# Patient Record
Sex: MALE | Race: BLACK OR AFRICAN AMERICAN | Employment: UNEMPLOYED | URBAN - METROPOLITAN AREA
[De-identification: names, ages, dates, MRNs, and addresses within clinical notes are randomized per-mention and may not be internally consistent; named-entity substitution may affect disease eponyms.]

---

## 2018-03-26 ENCOUNTER — HOSPITAL ENCOUNTER (INPATIENT)
Age: 43
LOS: 1 days | Discharge: HOME | DRG: 881 | End: 2018-03-28
Attending: EMERGENCY MEDICINE
Payer: SELF-PAY

## 2018-03-26 DIAGNOSIS — F33.9 EPISODE OF RECURRENT MAJOR DEPRESSIVE DISORDER, UNSPECIFIED DEPRESSION EPISODE SEVERITY (HCC): Primary | ICD-10-CM

## 2018-03-26 DIAGNOSIS — F19.10 SUBSTANCE ABUSE (HCC): ICD-10-CM

## 2018-03-26 LAB
ANION GAP SERPL CALC-SCNC: 9 MMOL/L (ref 5–15)
BASOPHILS # BLD: 0 K/UL (ref 0–0.1)
BASOPHILS NFR BLD: 1 % (ref 0–1)
BUN SERPL-MCNC: 13 MG/DL (ref 6–20)
BUN/CREAT SERPL: 13 (ref 12–20)
CALCIUM SERPL-MCNC: 8.9 MG/DL (ref 8.5–10.1)
CHLORIDE SERPL-SCNC: 106 MMOL/L (ref 97–108)
CO2 SERPL-SCNC: 27 MMOL/L (ref 21–32)
CREAT SERPL-MCNC: 1 MG/DL (ref 0.7–1.3)
DIFFERENTIAL METHOD BLD: ABNORMAL
EOSINOPHIL # BLD: 0.1 K/UL (ref 0–0.4)
EOSINOPHIL NFR BLD: 1 % (ref 0–7)
ERYTHROCYTE [DISTWIDTH] IN BLOOD BY AUTOMATED COUNT: 13.6 % (ref 11.5–14.5)
ETHANOL SERPL-MCNC: 205 MG/DL
GLUCOSE SERPL-MCNC: 97 MG/DL (ref 65–100)
HCT VFR BLD AUTO: 43.1 % (ref 36.6–50.3)
HGB BLD-MCNC: 14.6 G/DL (ref 12.1–17)
IMM GRANULOCYTES # BLD: 0 K/UL (ref 0–0.04)
IMM GRANULOCYTES NFR BLD AUTO: 0 % (ref 0–0.5)
LYMPHOCYTES # BLD: 2.8 K/UL (ref 0.8–3.5)
LYMPHOCYTES NFR BLD: 34 % (ref 12–49)
MCH RBC QN AUTO: 30.5 PG (ref 26–34)
MCHC RBC AUTO-ENTMCNC: 33.9 G/DL (ref 30–36.5)
MCV RBC AUTO: 90.2 FL (ref 80–99)
MONOCYTES # BLD: 0.3 K/UL (ref 0–1)
MONOCYTES NFR BLD: 3 % (ref 5–13)
NEUTS SEG # BLD: 5.2 K/UL (ref 1.8–8)
NEUTS SEG NFR BLD: 62 % (ref 32–75)
NRBC # BLD: 0 K/UL (ref 0–0.01)
NRBC BLD-RTO: 0 PER 100 WBC
PLATELET # BLD AUTO: 252 K/UL (ref 150–400)
PMV BLD AUTO: 9.1 FL (ref 8.9–12.9)
POTASSIUM SERPL-SCNC: 3.3 MMOL/L (ref 3.5–5.1)
RBC # BLD AUTO: 4.78 M/UL (ref 4.1–5.7)
SODIUM SERPL-SCNC: 142 MMOL/L (ref 136–145)
WBC # BLD AUTO: 8.4 K/UL (ref 4.1–11.1)

## 2018-03-26 PROCEDURE — 80307 DRUG TEST PRSMV CHEM ANLYZR: CPT | Performed by: EMERGENCY MEDICINE

## 2018-03-26 PROCEDURE — 81001 URINALYSIS AUTO W/SCOPE: CPT | Performed by: EMERGENCY MEDICINE

## 2018-03-26 PROCEDURE — 99284 EMERGENCY DEPT VISIT MOD MDM: CPT

## 2018-03-26 PROCEDURE — 85025 COMPLETE CBC W/AUTO DIFF WBC: CPT | Performed by: EMERGENCY MEDICINE

## 2018-03-26 PROCEDURE — 80048 BASIC METABOLIC PNL TOTAL CA: CPT | Performed by: EMERGENCY MEDICINE

## 2018-03-26 PROCEDURE — 36415 COLL VENOUS BLD VENIPUNCTURE: CPT | Performed by: EMERGENCY MEDICINE

## 2018-03-26 PROCEDURE — 90791 PSYCH DIAGNOSTIC EVALUATION: CPT

## 2018-03-26 NOTE — IP AVS SNAPSHOT
[facility] 
 
 
 [address] La[address] Chambers Patient: Christina Mcdermott MRN: [id number] KTV:4/4/3077 About your hospitalization You were admitted on:  March 27, 2018 You last received care in the:  [facility]. 291 You were discharged on:  March 28, 2018 Why you were hospitalized Your primary diagnosis was:  Not on File Your diagnoses also included:  Substance Induced Mood Disorder (Hcc) Follow-up Information Follow up With Details Comments Contact Info [address] to Rapid access appointment 8:00AM -11:00AM & 12:00PM-3:00PM for mental health and substance abuse services. [facility] 
[phone] Fax: [phone] None   None (395) Patient stated that they have no PCP Discharge Orders None A check tatiana indicates which time of day the medication should be taken. My Medications Notice You have not been prescribed any medications. Discharge Instructions [facility]  [phone] [address] [place] [address] [phone] [place] [address]  [phone] [employer] 96-   [phone] [employer]-  [phone] 9 [facility]  [phone] [address] [place] [address]  [phone] [employer] Announcement We are excited to announce that we are making your provider's discharge notes available to you in [employer]. You will see these notes when they are completed and signed by the physician that discharged you from your recent hospital stay. If you have any questions or concerns about any information you see in [employer], please call the Health Information Department where you were seen or reach out to your Primary Care Provider for more information about your plan of care. Introducing [facility] [place] [facility]!    
 [facility] introduces [employer] patient portal. Now you can access parts of your medical record, email your doctor's office, and request medication refills online. 1. In your internet browser, go to [email or web address] 2. Click on the First Time User? Click Here link in the Sign In box. You will see the New Member Sign Up page. 3. Enter your InToTally Access Code exactly as it appears below. You will not need to use this code after youve completed the sign-up process. If you do not sign up before the expiration date, you must request a new code. · InToTally Access Code: [email or web address] Expires: 6/26/2018 11:21 AM 
 
4. Enter the last four digits of your Social Security Number (xxxx) and Date of Birth (mm/dd/yyyy) as indicated and click Submit. You will be taken to the next sign-up page. 5. Create a InToTally ID. This will be your InToTally login ID and cannot be changed, so think of one that is secure and easy to remember. 6. Create a InToTally password. You can change your password at any time. 7. Enter your Password Reset Question and Answer. This can be used at a later time if you forget your password. 8. Enter your e-mail address. You will receive e-mail notification when new information is available in [address] 9. Click Sign Up. You can now view and download portions of your medical record. 10. Click the Download Summary menu link to download a portable copy of your medical information. If you have questions, please visit the Frequently Asked Questions section of the InToTally website. Remember, InToTally is NOT to be used for urgent needs. For medical emergencies, dial 911. Now available from your iPhone and Android! Introducing Eron Wheeler As a Lovenia Stain patient, I wanted to make you aware of our electronic visit tool called Eron Wheeler. Lovenia Stain 24/7 allows you to connect within minutes with a medical provider 24 hours a day, seven days a week via a mobile device or tablet or logging into a secure website from your computer. You can access pinnacle-ecs from anywhere in the [place]. A virtual visit might be right for you when you have a simple condition and feel like you just dont want to get out of bed, or cant get away from work for an appointment, when your regular Deanna Fothergill provider is not available (evenings, weekends or holidays), or when youre out of town and need minor care. Electronic visits cost only $49 and if the Carmelita Fothergill 24/7 provider determines a prescription is needed to treat your condition, one can be electronically transmitted to a nearby pharmacy*. Please take a moment to enroll today if you have not already done so. The enrollment process is free and takes just a few minutes. To enroll, please download the [employer] therSUPRll Neck Tie Koozies/Tecogen karo to your tablet or phone, or visit [email or web address] to enroll on your computer. And, as an [address] [place] [address] patient with a Wildfire account, the results of your visits will be scanned into your electronic medical record and your primary care provider will be able to view the scanned results. We urge you to continue to see your regular [facility] provider for your ongoing medical care. And while your primary care provider may not be the one available when you seek a Eron Simonsfin virtual visit, the peace of mind you get from getting a real diagnosis real time can be priceless. For more information on Worktopiaivafin, view our Frequently Asked Questions (FAQs) at [email or web address]. Sincerely, 
 
Toñito Whipple MD 
Chief Medical Officer [address] *:  certain medications cannot be prescribed via Worktopiaivafin Providers Seen During Your Hospitalization Provider Specialty Primary office phone Montse Hall MD Emergency Medicine [phone] Nhung Galeana MD Emergency Medicine [phone] Yamileth Moreira MD Psychiatry [phone] Jurgen Knight MD Psychiatry [phone] Your Primary Care Physician (PCP) Primary Care Physician Office Phone Office Fax NONE ** None ** ** None ** You are allergic to the following Allergen Reactions Shrimp Hives Recent Documentation Height Weight BMI Smoking Status 1.829 m 68.9 kg 20.61 kg/m2 Current Every Day Smoker Emergency Contacts Name Discharge Info Relation Home Work Mobile Vilma Pichardo N/A  AT THIS TIME [6] Girlfriend [18] [phone] Patient Belongings The following personal items are in your possession at time of discharge: 
  Dental Appliances: None  Visual Aid: None      Home Medications: None   Jewelry: None  Clothing: Hat, Jacket/Coat, Socks, [place], [place], Footwear, Undergarments, Shirt    Other Valuables:  (ID) Please provide this summary of care documentation to your next provider. Signatures-by signing, you are acknowledging that this After Visit Summary has been reviewed with you and you have received a copy. Patient Signature:  ____________________________________________________________ Date:  ____________________________________________________________  
  
Akshat Current Provider Signature:  ____________________________________________________________ Date:  ____________________________________________________________

## 2018-03-26 NOTE — IP AVS SNAPSHOT
Nikki Hassan 
 
 
 [address] Patient: Danielle Waldrop MRN: [id number] VGP:7/5/7623 A check tatiana indicates which time of day the medication should be taken. My Medications Notice You have not been prescribed any medications.

## 2018-03-26 NOTE — IP AVS SNAPSHOT
Summary of Care Report The Summary of Care report has been created to help improve care coordination. Users with access to [employer] or [address] (Web-based application) may access additional patient information including the Discharge Summary. If you are not currently a [address] user and need more information, please call the number listed below in the [place] [address] section and ask to be connected with Medical Records. Facility Information Name Address Phone Nadine [phone] [place] [id number] [phone] Patient Information Patient Name Sex JERRY Garza ([id number]) Male 1975 Discharge Information Admitting Provider Service Area Unit Ruth Soares MD / [phone] [id number] [address] [facility] / [phone] Discharge Provider Discharge Date/Time Discharge Disposition Destination (none) 3/28/2018 Morning (Pending) AHR (none) Patient Language Language ENGLISH [13] Hospital Problems as of 3/28/2018  Reviewed: 10/17/2013 12:32 PM by Yuliana Cancino NP Class Noted - Resolved Last Modified POA Active Problems Substance induced mood disorder ([facility] [address]  3/27/2018 - Present 3/27/2018 by Ruth Soares MD Unknown Entered by Ruth Soares MD  
  
Non-Hospital Problems as of 3/28/2018  Reviewed: 10/17/2013 12:32 PM by Yuliana Cancino NP Class Noted - Resolved Last Modified Active Problems Depressive disorder, not elsewhere classified  10/17/2013 - Present 10/17/2013 by Yuliana Cancino NP Entered by Yuliana Cancino NP Polysubstance abuse  10/17/2013 - Present 10/17/2013 by Yuliana Cancino NP Entered by Yuliana Cancino NP You are allergic to the following Allergen Reactions Shrimp Hives Current Discharge Medication List  
  
 Notice You have not been prescribed any medications. Current Immunizations Name Date Influenza Vaccine PF  Deferred ()  
 TD Vaccine 7/3/2011 Follow-up Information Follow up With Details Comments Contact Info [address] to Rapid access appointment 8:00AM -11:00AM & 12:00PM-3:00PM for mental health and substance abuse services. [facility] 
[phone] Fax: [phone] None   None (395) Patient stated that they have no PCP Discharge Instructions [facility]ez [facility]  [phone] [address] [place] [address] [phone] [place] [address]  [phone] [employer] [address]-   [phone] [employer]-  [phone] 1 [facility]  [phone] [address] [place] [address]  [phone] Chart Review Routing History No Routing History on File

## 2018-03-27 PROBLEM — F19.94 SUBSTANCE INDUCED MOOD DISORDER (HCC): Status: ACTIVE | Noted: 2018-03-27

## 2018-03-27 LAB
AMPHET UR QL SCN: NEGATIVE
APPEARANCE UR: CLEAR
BACTERIA URNS QL MICRO: NEGATIVE /HPF
BARBITURATES UR QL SCN: NEGATIVE
BENZODIAZ UR QL: NEGATIVE
BILIRUB UR QL: NEGATIVE
CANNABINOIDS UR QL SCN: NEGATIVE
COCAINE UR QL SCN: POSITIVE
COLOR UR: NORMAL
DRUG SCRN COMMENT,DRGCM: ABNORMAL
EPITH CASTS URNS QL MICRO: NORMAL /LPF
GLUCOSE UR STRIP.AUTO-MCNC: NEGATIVE MG/DL
HGB UR QL STRIP: NEGATIVE
KETONES UR QL STRIP.AUTO: NEGATIVE MG/DL
LEUKOCYTE ESTERASE UR QL STRIP.AUTO: NEGATIVE
METHADONE UR QL: NEGATIVE
NITRITE UR QL STRIP.AUTO: NEGATIVE
OPIATES UR QL: NEGATIVE
PCP UR QL: NEGATIVE
PH UR STRIP: 6 [PH] (ref 5–8)
PROT UR STRIP-MCNC: NEGATIVE MG/DL
RBC #/AREA URNS HPF: NORMAL /HPF (ref 0–5)
SP GR UR REFRACTOMETRY: <1.005 (ref 1–1.03)
UA: UC IF INDICATED,UAUC: NORMAL
UROBILINOGEN UR QL STRIP.AUTO: 0.2 EU/DL (ref 0.2–1)
WBC URNS QL MICRO: NORMAL /HPF (ref 0–4)

## 2018-03-27 PROCEDURE — 74011250637 HC RX REV CODE- 250/637

## 2018-03-27 PROCEDURE — 65220000003 HC RM SEMIPRIVATE PSYCH

## 2018-03-27 RX ORDER — LORAZEPAM 1 MG/1
1 TABLET ORAL
Status: DISCONTINUED | OUTPATIENT
Start: 2018-03-27 | End: 2018-03-28 | Stop reason: HOSPADM

## 2018-03-27 RX ORDER — LORAZEPAM 2 MG/ML
2 INJECTION INTRAMUSCULAR
Status: DISCONTINUED | OUTPATIENT
Start: 2018-03-27 | End: 2018-03-28 | Stop reason: HOSPADM

## 2018-03-27 RX ORDER — IBUPROFEN 200 MG
1 TABLET ORAL
Status: DISCONTINUED | OUTPATIENT
Start: 2018-03-27 | End: 2018-03-28 | Stop reason: HOSPADM

## 2018-03-27 RX ORDER — ACETAMINOPHEN 325 MG/1
650 TABLET ORAL
Status: DISCONTINUED | OUTPATIENT
Start: 2018-03-27 | End: 2018-03-28 | Stop reason: HOSPADM

## 2018-03-27 RX ORDER — BENZTROPINE MESYLATE 1 MG/ML
2 INJECTION INTRAMUSCULAR; INTRAVENOUS
Status: DISCONTINUED | OUTPATIENT
Start: 2018-03-27 | End: 2018-03-28 | Stop reason: HOSPADM

## 2018-03-27 RX ORDER — ADHESIVE BANDAGE
30 BANDAGE TOPICAL DAILY PRN
Status: DISCONTINUED | OUTPATIENT
Start: 2018-03-27 | End: 2018-03-28 | Stop reason: HOSPADM

## 2018-03-27 RX ORDER — IBUPROFEN 400 MG/1
400 TABLET ORAL
Status: DISCONTINUED | OUTPATIENT
Start: 2018-03-27 | End: 2018-03-28 | Stop reason: HOSPADM

## 2018-03-27 RX ORDER — OLANZAPINE 5 MG/1
5 TABLET ORAL
Status: DISCONTINUED | OUTPATIENT
Start: 2018-03-27 | End: 2018-03-28 | Stop reason: HOSPADM

## 2018-03-27 RX ORDER — BENZTROPINE MESYLATE 2 MG/1
2 TABLET ORAL
Status: DISCONTINUED | OUTPATIENT
Start: 2018-03-27 | End: 2018-03-28 | Stop reason: HOSPADM

## 2018-03-27 RX ORDER — FOLIC ACID 1 MG/1
1 TABLET ORAL DAILY
Status: DISCONTINUED | OUTPATIENT
Start: 2018-03-27 | End: 2018-03-28 | Stop reason: HOSPADM

## 2018-03-27 RX ORDER — ZOLPIDEM TARTRATE 10 MG/1
10 TABLET ORAL
Status: DISCONTINUED | OUTPATIENT
Start: 2018-03-27 | End: 2018-03-28 | Stop reason: HOSPADM

## 2018-03-27 RX ORDER — LANOLIN ALCOHOL/MO/W.PET/CERES
100 CREAM (GRAM) TOPICAL DAILY
Status: DISCONTINUED | OUTPATIENT
Start: 2018-03-27 | End: 2018-03-28 | Stop reason: HOSPADM

## 2018-03-27 RX ADMIN — MULTIPLE VITAMINS W/ MINERALS TAB 1 TABLET: TAB at 08:56

## 2018-03-27 RX ADMIN — Medication 100 MG: at 08:56

## 2018-03-27 RX ADMIN — FOLIC ACID 1 MG: 1 TABLET ORAL at 08:56

## 2018-03-27 NOTE — BH NOTES
Pt admitted for increased depression with SI/HI with no plan or focus. Pt's girlfriend left him 3/15 and he has been depressed. Pt stated he started to drink beer and use cocaine a couple days ago after being clean for 2-3 years. Pt reports diagnosis of depression in past, not currently taking any medications. Pt is current smoker, 1/2 pack/day. Pt lives in an apartment with his mother as his support system. Pt has depressed, flat affect, soft speech and poor eye contact. Skin search revealed tattoos on forearms, upper chest and abdomin bilaterally as well as old scars on back and legs. Pt given bag lunch and oriented to unit/room. Safety checks initiated Q15.

## 2018-03-27 NOTE — BH NOTES
GROUP THERAPY PROGRESS NOTE    The patient Jonathon Camarena a 39 y.o. male is participating in Creative Expression Group. Group time: 1 hour    Personal goal for participation:  To concentrate on selected task    Goal orientation: social    Group therapy participation: minimal    Therapeutic interventions reviewed and discussed: Crafts, games, music    Impression of participation: The patient was present-elected to watch    Renaee Cheek  3/27/2018  6:19 PM

## 2018-03-27 NOTE — ED NOTES
Report called to [facility]. Landry Tavera RN receiving. SBAR given. Pt to be transported to [facility] via wheelchair by Aakash.

## 2018-03-27 NOTE — PROGRESS NOTES
[facility] Medication Reconciliation     Recommendations/Findings: Patient denies taking any inhalers, over-the-counter medications, or prescription medications    Total Time Spent: 5 min    Information obtained from: patient    Past Medical History/Disease States:  Past Medical History:   Diagnosis Date    Psychiatric disorder     depression, substance abuse     Patient allergies:    Allergies as of 03/26/2018 - Review Complete 03/26/2018   Allergen Reaction Noted    Shrimp Hives 03/26/2018       Thank you,  Santana Catalan, PHARMD

## 2018-03-27 NOTE — BH NOTES
PSYCHOSOCIAL ASSESSMENT  :Patient identifying info: Avery Oneil is a 39 y.o., male admitted 3/26/2018 10:44 PM     Presenting problem and precipitating factors:  Pt reports that his girlfriend of 4 years left him unexpectedly and he has felt down and having suicidal thoughts. Pt reports drinking two beers last night for the first time in almost 5 years and smoked crack cocaine last night. Per BSMART, pt reported he has not been sleeping or eating over the past 3-4 days. Mental status assessment:  Pt is alert and oriented. Pt denies SI/HI. Pt's mood is depressed, affect is flat. Pt's thought process is logical.  Pt's insight and judgment are limited, reliability is fair. Current psychiatric providers and contact info: Pt reports no mental health or substance abuse services in community. Previous psychiatric services/providers and response to treatment: Patient was previously hospitalized in 2013 at [facility] for substance abuse when he reports he was using regularly. Family history of mental illness: None reported. Substance abuse history: Patient reported he relapsed today using alcohol and crack cocaine. UDS +cocaine, BAL= 205  Social History   Substance Use Topics    Smoking status: Current Every Day Smoker     Packs/day: 0.50    Smokeless tobacco: Never Used    Alcohol use Yes       Family constellation: Pt is single and has a 10year old daughter that lives with mother. Is significant other involved? No.    Describe support system: Pt reports no sources of support. Describe living arrangements and home environment:  Pt reports living alone on the [facility]. Health issues:   Hospital Problems  Date Reviewed: 10/17/2013          Codes Class Noted POA    [facility] ICD-10-CM: F19.94  ICD-9-CM: 292.84  3/27/2018 Unknown              Trauma history: None reported    Legal issues: No legal issues pending.      History of  service: None reported. Financial status: Employment. Scientology/cultural factors: None expressed at this time. Education/work history:  Pt works for a [employer] and has a 7th grade education. Have you been licensed as a zachary care professional (current or ): No.   Leisure and recreation preferences: Spending time with his daughter. Describe coping skills: limited.      Lowell First  3/27/2018

## 2018-03-27 NOTE — BSMART NOTE
Comprehensive Assessment Form Part 1      Section I - Disposition    Axis I - Substance Induced Mood Disorder                Depressive Disorder                Polysubstance Abuse   Axis II - Deferred  Axis III -   Past Medical History Date Comments      Psychiatric disorder [F99]  depression, substance abuse           Axis IV - Recent break-up without notice  [place] V - 61      The Medical Doctor to Psychiatrist conference was not completed. The Medical Doctor is in agreement with Psychiatrist disposition because of (reason) patient is seeking hospitalization as voluntary admission. The plan is to admit to [facility] .  The on-call Psychiatrist consulted was Dr. Savita Herman . The admitting Psychiatrist will be Dr. Savita Herman . The admitting Diagnosis is Substance Induced Mood Disorder. The Payor source is SELF PAY/ZOZII SELF PAY. The name of the representative was . This was approved for  days. The authorization number is . Section II - Integrated Summary  Summary:  Patient is 39year old AA male reporting to ED Patient states that he has been going through a lot recently. Drank two beers tonight for the first time in a while. Also used crack cocaine tonight. At bedside, patient reported he was having suicidal thoughts reported that he has thought about plans and ways to harm himself but plans appear to be vague. Patient denied previous attempts to harm himself and patient denied homicidal thoughts and hallucinations. Patient was previously hospitalized in 2013. Patient denied having a psychiatrist. Patient reported that his girlfriend moved March 15 th without notice. Patient reported having suicidal thoughts for about 3-4 days as he has been depressed since she left due to not knowing. Patient reported that he was with her for four years. Patient reported that he is now living with his mother and he stated that is going somewhat.  Patient reported he relapse today using alcohol and cocaine. Patient reported he hasnot been sleeping or eating over the past 3-4 days. Patient verbalized not feeling safe with himself. Patient presented with flat affect, tearful and depressed. Patient was cooperative. The patient has demonstrated mental capacity to provide informed consent. The information is given by the patient. The Chief Complaint is suicidal, depressed. The Precipitant Factors are relationship problems, substance abuse. Previous Hospitalizations: yes  The patient has not previously been in restraints. Current Psychiatrist and/or  is none. Lethality Assessment:    The potential for suicide noted by the following: vague plan and ideation . The potential for homicide is not noted. The patient has not been a perpetrator of sexual or physical abuse. There are not pending charges. The patient is not felt to be at risk for self harm or harm to others. The attending nurse was advised not noted. Section III - Psychosocial  The patient's overall mood and attitude is low mood, cooperative and calm. Feelings of helplessness and hopelessness are not observed. Generalized anxiety is not observed. Panic is not observed. Phobias are not observed. Obsessive compulsive tendencies are not observed. Section IV - Mental Status Exam  The patient's appearance shows no evidence of impairment. The patient's behavior shows no evidence of impairment. The patient is oriented to time, place, person and situation. The patient's speech shows no evidence of impairment. The patient's mood is depressed and is sad. The range of affect is flat. The patient's thought content demonstrates no evidence of impairment. The thought process shows no evidence of impairment. The patient's perception shows no evidence of impairment. The patient's memory shows no evidence of impairment. The patient's appetite shows no evidence of impairment. The patient's sleep shows no evidence of impairment. The patient's insight shows no evidence of impairment. The patient's judgement shows no evidence of impairment. Section V - Substance Abuse  The patient is using substances. The patient is using tobacco by inhalation for greater than 10 years with last use on today, alcohol for greater than 10 years with last use on today (relapse) and cocaine by inhalation for greater than 10 years with last use on today (relapse). The patient has experienced the following withdrawal symptoms: N/A. Section VI - Living Arrangements  The patient is single. The patient lives with mother. The patient has one child age 10 years. The patient does plan to return home upon discharge. The patient does not have legal issues pending. The patient's source of income comes from employment. Restoration and cultural practices have not been voiced at this time. The patient's greatest support comes from no one reported and this person will not be involved with the treatment. The patient has not been in an event described as horrible or outside the realm of ordinary life experience either currently or in the past.  The patient has not been a victim of sexual/physical abuse. Section VII - Other Areas of Clinical Concern  The highest grade achieved is 7th with the overall quality of school experience being described as unknown. The patient is currently employed and speaks [place] as a primary language. The patient has no communication impairments affecting communication. The patient's preference for learning can be described as: can read and write adequately.   The patient's hearing is normal.  The patient's vision is normal.      Francy Jacobo

## 2018-03-27 NOTE — BH NOTES
GROUP THERAPY PROGRESS NOTE    The patient Susan Bennett a 39 y.o. male is participating in Coping Skills Group. Group time: 45 minutes    Personal goal for participation: To learn about mental illness    Goal orientation:  personal    Group therapy participation: active    Therapeutic interventions reviewed and discussed: educational DVD    Impression of participation:  The patient was attentive.     Ronn Kayser  3/27/2018  5:17 PM

## 2018-03-27 NOTE — H&P
INITIAL PSYCHIATRIC EVALUATION            IDENTIFICATION:    Patient Name  Silva Ward   Date of Birth 1975   [facility] [id number]   Medical Record Number  [id number]      Age  39 y.o. PCP None   Admit date:  3/26/2018    Room Number  323/02  @ [facility]   Date of Service  3/27/2018            HISTORY         REASON FOR HOSPITALIZATION:  CC: Pt admitted under a voluntary basis for severe depression with suicidal ideations proving to be an imminent danger to self and an inability to care for self. HISTORY OF PRESENT ILLNESS:    The patient, Silva Ward, is a 39 y.o. BLACK OR  male with a past psychiatric history significant for depression and drinking alcohol , who presents at this time with complaints of (and/or evidence of) the following emotional symptoms: depression and anxiety. Additional symptomatology include alcohol abuse and feeling depressed. The above symptoms have been present for days . These symptoms are of mild severity. These symptoms are constant  in nature. The patient's condition has been precipitated by breaking up with girlfriend and psychosocial stressors (issues in relationship  ). Patient's condition made worse by continued illicit drug use and alcohol use as well as treatment noncompliance. UDS: Positive ; ZYW=543      ALLERGIES:   Allergies   Allergen Reactions    Shrimp Hives      MEDICATIONS PRIOR TO ADMISSION:   No prescriptions prior to admission. PAST MEDICAL HISTORY:   Past Medical History:   Diagnosis Date    Psychiatric disorder     depression, substance abuse   History reviewed. No pertinent surgical history. SOCIAL HISTORY:    Social History     Social History    Marital status:      Spouse name: N/A    Number of children: N/A    Years of education: N/A     Occupational History    Not on file.      Social History Main Topics    Smoking status: Current Every Day Smoker     Packs/day: 0.50    Smokeless tobacco: Never Used    Alcohol use Yes    Drug use: Yes     Special: Cocaine      Comment: crack cocaine tonight 3/26/18    Sexual activity: Yes     Other Topics Concern    Not on file     Social History Narrative      FAMILY HISTORY: History reviewed. No pertinent family history. Family History   Problem Relation Age of Onset    Hypertension Mother     Heart Disease Father        REVIEW OF SYSTEMS:   History obtained from the patient  Pertinent items are noted in the History of Present Illness. All other Systems reviewed and are considered negative. MENTAL STATUS EXAM & VITALS     MENTAL STATUS EXAM (MSE):    MSE FINDINGS ARE WITHIN NORMAL LIMITS (WNL) UNLESS OTHERWISE STATED BELOW. ( ALL OF THE BELOW CATEGORIES OF THE MSE HAVE BEEN REVIEWED (reviewed 3/27/2018) AND UPDATED AS DEEMED APPROPRIATE )  General Presentation age appropriate, cooperative   Orientation oriented to time, place and person   Vital Signs  See below (reviewed 3/27/2018); Vital Signs (BP, Pulse, & Temp) are within normal limits if not listed below.    Gait and Station Stable/steady, no ataxia   Musculoskeletal System No extrapyramidal symptoms (EPS); no abnormal muscular movements or Tardive Dyskinesia (TD); muscle strength and tone are within normal limits   Language No aphasia or dysarthria   Speech:  normal pitch and normal volume   Thought Processes logical; normal rate of thoughts; fair abstract reasoning/computation   Thought Associations goal directed   Thought Content free of delusions   Suicidal Ideations no plan  and no intention   Homicidal Ideations no plan  and no intention   Mood:  depressed   Affect:  depressed   Memory recent  intact   Memory remote:  intact   Concentration/Attention:  good   Fund of Knowledge average   Insight:  good   Reliability fair   Judgment:  fair          VITALS:     Patient Vitals for the past 24 hrs:   Temp Pulse Resp BP SpO2   03/27/18 0216 97.7 °F (36.5 °C) 79 16 127/89 -   03/27/18 0142 - 74 16 (!) 124/92 100 %   03/26/18 2327 97.1 °F (36.2 °C) 82 16 (!) 129/100 100 %     Wt Readings from Last 3 Encounters:   03/27/18 68.9 kg (152 lb)   01/11/15 68.5 kg (151 lb)   10/16/13 68 kg (150 lb)     Temp Readings from Last 3 Encounters:   03/27/18 97.7 °F (36.5 °C)   01/11/15 98.6 °F (37 °C)   10/17/13 97 °F (36.1 °C)     BP Readings from Last 3 Encounters:   03/27/18 127/89   01/11/15 (!) 157/99   10/17/13 (!) 153/108     Pulse Readings from Last 3 Encounters:   03/27/18 79   01/11/15 99   10/17/13 62            DATA     LABORATORY DATA:  Labs Reviewed   CBC WITH AUTOMATED DIFF - Abnormal; Notable for the following:        Result Value    MONOCYTES 3 (*)     All other components within normal limits   METABOLIC PANEL, BASIC - Abnormal; Notable for the following:     Potassium 3.3 (*)     All other components within normal limits   ETHYL ALCOHOL - Abnormal; Notable for the following:     ALCOHOL(ETHYL),SERUM 205 (*)     All other components within normal limits   DRUG SCREEN, URINE - Abnormal; Notable for the following:     COCAINE POSITIVE (*)     All other components within normal limits   URINALYSIS W/ REFLEX CULTURE     Admission on 03/26/2018   Component Date Value Ref Range Status    WBC 03/26/2018 8.4  4.1 - 11.1 K/uL Final    RBC 03/26/2018 4.78  4.10 - 5.70 M/uL Final    HGB 03/26/2018 14.6  12.1 - 17.0 g/dL Final    HCT 03/26/2018 43.1  36.6 - 50.3 % Final    MCV 03/26/2018 90.2  80.0 - 99.0 FL Final    MCH 03/26/2018 30.5  26.0 - 34.0 PG Final    MCHC 03/26/2018 33.9  30.0 - 36.5 g/dL Final    RDW 03/26/2018 13.6  11.5 - 14.5 % Final    PLATELET 82/73/5591 668  150 - 400 K/uL Final    MPV 03/26/2018 9.1  8.9 - 12.9 FL Final    NRBC 03/26/2018 0.0  0  WBC Final    ABSOLUTE NRBC 03/26/2018 0.00  0.00 - 0.01 K/uL Final    NEUTROPHILS 03/26/2018 62  32 - 75 % Final    LYMPHOCYTES 03/26/2018 34  12 - 49 % Final    MONOCYTES 03/26/2018 3* 5 - 13 % Final    EOSINOPHILS 03/26/2018 1  0 - 7 % Final    BASOPHILS 03/26/2018 1  0 - 1 % Final    IMMATURE GRANULOCYTES 03/26/2018 0  0.0 - 0.5 % Final    ABS. NEUTROPHILS 03/26/2018 5.2  1.8 - 8.0 K/UL Final    ABS. LYMPHOCYTES 03/26/2018 2.8  0.8 - 3.5 K/UL Final    ABS. MONOCYTES 03/26/2018 0.3  0.0 - 1.0 K/UL Final    ABS. EOSINOPHILS 03/26/2018 0.1  0.0 - 0.4 K/UL Final    ABS. BASOPHILS 03/26/2018 0.0  0.0 - 0.1 K/UL Final    ABS. IMM.  GRANS. 03/26/2018 0.0  0.00 - 0.04 K/UL Final    DF 03/26/2018 AUTOMATED    Final    Sodium 03/26/2018 142  136 - 145 mmol/L Final    Potassium 03/26/2018 3.3* 3.5 - 5.1 mmol/L Final    Chloride 03/26/2018 106  97 - 108 mmol/L Final    CO2 03/26/2018 27  21 - 32 mmol/L Final    Anion gap 03/26/2018 9  5 - 15 mmol/L Final    Glucose 03/26/2018 97  65 - 100 mg/dL Final    BUN 03/26/2018 13  6 - 20 MG/DL Final    Creatinine 03/26/2018 1.00  0.70 - 1.30 MG/DL Final    BUN/Creatinine ratio 03/26/2018 13  12 - 20   Final    GFR est AA 03/26/2018 >60  >60 ml/min/1.73m2 Final    GFR est non-AA 03/26/2018 >60  >60 ml/min/1.73m2 Final    Calcium 03/26/2018 8.9  8.5 - 10.1 MG/DL Final    ALCOHOL(ETHYL),SERUM 03/26/2018 205* <10 MG/DL Final    AMPHETAMINES 03/26/2018 NEGATIVE   NEG   Final    BARBITURATES 03/26/2018 NEGATIVE   NEG   Final    BENZODIAZEPINES 03/26/2018 NEGATIVE   NEG   Final    COCAINE 03/26/2018 POSITIVE* NEG   Final    METHADONE 03/26/2018 NEGATIVE   NEG   Final    OPIATES 03/26/2018 NEGATIVE   NEG   Final    PCP(PHENCYCLIDINE) 03/26/2018 NEGATIVE   NEG   Final    THC (TH-CANNABINOL) 03/26/2018 NEGATIVE   NEG   Final    Drug screen comment 03/26/2018 (NOTE)   Final    Color 03/26/2018 YELLOW/STRAW    Final    Appearance 03/26/2018 CLEAR  CLEAR   Final    Specific gravity 03/26/2018 <1.005  1.003 - 1.030 Final    pH (UA) 03/26/2018 6.0  5.0 - 8.0   Final    Protein 03/26/2018 NEGATIVE   NEG mg/dL Final    Glucose 03/26/2018 NEGATIVE   NEG mg/dL Final    Ketone 03/26/2018 NEGATIVE   NEG mg/dL Final    Bilirubin 03/26/2018 NEGATIVE   NEG   Final    Blood 03/26/2018 NEGATIVE   NEG   Final    Urobilinogen 03/26/2018 0.2  0.2 - 1.0 EU/dL Final    Nitrites 03/26/2018 NEGATIVE   NEG   Final    Leukocyte Esterase 03/26/2018 NEGATIVE   NEG   Final    WBC 03/26/2018 0-4  0 - 4 /hpf Final    RBC 03/26/2018 0-5  0 - 5 /hpf Final    Epithelial cells 03/26/2018 FEW  FEW /lpf Final    Bacteria 03/26/2018 NEGATIVE   NEG /hpf Final    UA:UC IF INDICATED 03/26/2018 CULTURE NOT INDICATED BY UA RESULT  CNI   Final        RADIOLOGY REPORTS:    Results from Hospital Encounter encounter on 09/08/13   XR CHEST PA LAT   Narrative **Final Report**      ICD Codes / Adm. Diagnosis: 234  786.2 / Chest Congestion    Examination:  CR CHEST PA AND LATERAL  - [id number] - Sep  8 2013  2:16PM  Accession No:  [id number]  Reason:  productive cough / fever / chills      REPORT:  INDICATION:  productive cough / fever / chills    Exam: Chest 2 views. Comparison April 20, 2013. Findings: Cardiomediastinal silhouette is normal. Pulmonary vasculature is   not engorged. No focal parenchymal opacities, effusions, or pneumothorax. Bony thorax is intact. IMPRESSION:  1. No acute cardiopulmonary disease           Signing/Reading Doctor: Eduarda Huff ([id number])    Approved: Ирина WHITEHEAD ([id number])  Sep  8 2013  2:30PM                               No results found.            MEDICATIONS       ALL MEDICATIONS  Current Facility-Administered Medications   Medication Dose Route Frequency    ziprasidone (GEODON) 20 mg in sterile water (preservative free) 1 mL injection  20 mg IntraMUSCular BID PRN    OLANZapine (ZyPREXA) tablet 5 mg  5 mg Oral Q6H PRN    benztropine (COGENTIN) tablet 2 mg  2 mg Oral BID PRN    benztropine (COGENTIN) injection 2 mg  2 mg IntraMUSCular BID PRN    LORazepam (ATIVAN) injection 2 mg  2 mg IntraMUSCular Q4H PRN    LORazepam (ATIVAN) tablet 1 mg  1 mg Oral Q4H PRN    zolpidem (AMBIEN) tablet 10 mg  10 mg Oral QHS PRN    acetaminophen (TYLENOL) tablet 650 mg  650 mg Oral Q4H PRN    ibuprofen (MOTRIN) tablet 400 mg  400 mg Oral Q8H PRN    magnesium hydroxide (MILK OF MAGNESIA) 400 mg/5 mL oral suspension 30 mL  30 mL Oral DAILY PRN    nicotine (NICODERM CQ) 21 mg/24 hr patch 1 Patch  1 Patch TransDERmal DAILY PRN    folic acid (FOLVITE) tablet 1 mg  1 mg Oral DAILY    thiamine (B-1) tablet 100 mg  100 mg Oral DAILY    multivitamin, tx-iron-ca-min (THERA-M w/ IRON) tablet 1 Tab  1 Tab Oral DAILY      SCHEDULED MEDICATIONS  Current Facility-Administered Medications   Medication Dose Route Frequency    folic acid (FOLVITE) tablet 1 mg  1 mg Oral DAILY    thiamine (B-1) tablet 100 mg  100 mg Oral DAILY    multivitamin, tx-iron-ca-min (THERA-M w/ IRON) tablet 1 Tab  1 Tab Oral DAILY                ASSESSMENT & PLAN        The patient, Ilene Andrea, is a 39 y.o.  male who presents at this time for treatment of the following diagnoses:  Patient Active Hospital Problem List:   Substance induced mood disorder ([facility] [address] (3/27/2018)    Assessment: depression and anxiety     Plan: therapy and he declined medications            I will continue to monitor blood levels (Depakote, Tegretol, lithium, clozapine---a drug with a narrow therapeutic index= NTI) and associated labs for drug therapy implemented that require intense monitoring for toxicity as deemed appropriate based on current medication side effects and pharmacodynamically determined drug 1/2 lives. A coordinated, multidisplinary treatment team (includes the nurse, unit pharmcist,  and writer) round was conducted for this initial evaluation with the patient present. The following regarding medications was addressed during rounds with patient:   the risks and benefits of the proposed medication. The patient was given the opportunity to ask questions. Informed consent given to the use of the above medications. I will continue to adjust psychiatric and non-psychiatric medications (see above \"medication\" section and orders section for details) as deemed appropriate & based upon diagnoses and response to treatment. I have reviewed admission (and previous/old) labs and medical tests in the EHR and or transferring hospital documents. I will continue to order blood tests/labs and diagnostic tests as deemed appropriate and review results as they become available (see orders for details). I have reviewed old psychiatric and medical records available in the EHR. I Will order additional psychiatric records from other institutions to further elucidate the nature of patient's psychopathology and review once available. I will gather additional collateral information from friends, family and o/p treatment team to further elucidate the nature of patient's psychopathology and baselline level of psychiatric functioning.       ESTIMATED LENGTH OF STAY:    3       STRENGTHS:  Exercising self-direction/Resourceful, Access to housing/residential stability and Interpersonal/supportive relationships (family, friends, peers)                                        SIGNED:    Miracle Samuel MD  3/27/2018

## 2018-03-27 NOTE — CONSULTS
Medical Consult for [facility] Patient    Consult H&P   dictated, see patient chart    Impression:    Rosalio Hernández a 39 y.o. male with past medical history of depression and substance abuse presents with behavioral health problems of suicidal and homicidal ideation with crack cocaine use admitted for further psychiatric evaluation and treatment. Plan:   1. Psychiatry to manage mental health issues. 2. Psychiatry to manage substance withdrawal symptoms. 3. Medically stable at this time, will follow up as needed. 4. No VTE prophylaxis indicated or necessary at this time.      Thank you  Jesús Aburto MD  3/27/2018, 5:21 PM

## 2018-03-27 NOTE — ED NOTES
Emergency Department Nursing Plan of Care       The Nursing Plan of Care is developed from the Nursing assessment and Emergency Department Attending provider initial evaluation. The plan of care may be reviewed in the ED Provider note.     The Plan of Care was developed with the following considerations:   Patient / Family readiness to learn indicated by:verbalized understanding  Persons(s) to be included in education: patient  Barriers to Learning/Limitations:No    Signed     Pablito FRANKLYN Mercado    3/26/2018   11:14 PM

## 2018-03-27 NOTE — ED NOTES
Patient states that he has been going through a lot recently. Drank two beers tonight for the first time in a while. Also used crack cocaine tonight.

## 2018-03-27 NOTE — ED PROVIDER NOTES
EMERGENCY DEPARTMENT HISTORY AND PHYSICAL EXAM      Date: 3/26/2018  Patient Name: Nyasia Ramires    History of Presenting Illness     Chief Complaint   Patient presents with    Depression     reports SI/HI. unable to say why depressed. no plan. reports feeling depressed for 3-4 days. tearful. History Provided By: Patient    HPI: Nyasia Ramires, 39 y.o. male with PMHx significant for depression and substance abuse, presents via EMS to the ED with cc of suicidal ideation with associated homicidal ideation. He is not currently on medications for depression. Pt is unable to give an onset of his symptoms. Pt explains he has \"been going through a lot\". He states he \"doesn't know why he is here\". Pt has been drinking today. He explains he has had 2 beers PTA. Pt also states he did crack PTA. Pt is willing to be admitted and kept in the hospital after being evaluated by a counselor. He states he has not previously been admitted by [facility]. Pt reports no pain with his symptoms. As there are no complaints of pain, there are no severity (0/10) or quality regarding the pt's presenting complaint. PCP: None    There are no other complaints, changes, or physical findings at this time. Current Outpatient Prescriptions   Medication Sig Dispense Refill    docosanol (ABREVA) 10 % topical cream Apply  to affected area five (5) times daily. 2 g 0       Past History     Past Medical History:  Past Medical History:   Diagnosis Date    Psychiatric disorder     depression, substance abuse       Past Surgical History:  History reviewed. No pertinent surgical history. Family History:  Family History   Problem Relation Age of Onset    Hypertension Mother     Heart Disease Father        Social History:  Social History   Substance Use Topics    Smoking status: Current Every Day Smoker     Packs/day: 0.50    Smokeless tobacco: Never Used    Alcohol use Yes       Allergies:   Allergies   Allergen Reactions    Shrimp Hives         Review of Systems   Review of Systems   Constitutional: Negative for fever. HENT: Negative for sore throat and trouble swallowing. Eyes: Negative for photophobia and redness. Respiratory: Negative for cough and shortness of breath. Cardiovascular: Negative for chest pain and leg swelling. Gastrointestinal: Negative for abdominal pain, constipation, diarrhea, nausea and vomiting. Endocrine: Negative for polydipsia and polyuria. Genitourinary: Negative for dysuria, hematuria and scrotal swelling. Musculoskeletal: Negative for back pain and joint swelling. Skin: Negative for rash. Neurological: Negative for dizziness, syncope, weakness and headaches. Psychiatric/Behavioral: Positive for suicidal ideas.        + HI   All other systems reviewed and are negative. Physical Exam   Physical Exam   Constitutional: He is oriented to person, place, and time. He appears well-developed and well-nourished. Tearful. HENT:   Head: Normocephalic and atraumatic. Mouth/Throat: Oropharynx is clear and moist. No oropharyngeal exudate. Eyes: Conjunctivae and EOM are normal. Pupils are equal, round, and reactive to light. Left eye exhibits no discharge. Neck: Normal range of motion. Neck supple. No JVD present. Cardiovascular: Normal rate, regular rhythm, normal heart sounds and intact distal pulses. Pulmonary/Chest: Effort normal and breath sounds normal. No respiratory distress. He has no wheezes. Abdominal: Soft. Bowel sounds are normal. He exhibits no distension. There is no tenderness. There is no rebound and no guarding. Musculoskeletal: Normal range of motion. He exhibits no edema or tenderness. Lymphadenopathy:     He has no cervical adenopathy. Neurological: He is alert and oriented to person, place, and time. He has normal reflexes. No cranial nerve deficit. Skin: Skin is warm and dry. No rash noted. Psychiatric: He has a normal mood and affect.  His behavior is normal. He expresses homicidal and suicidal ideation. Nursing note and vitals reviewed. Diagnostic Study Results     Labs -     Recent Results (from the past 12 hour(s))   CBC WITH AUTOMATED DIFF    Collection Time: 03/26/18 11:24 PM   Result Value Ref Range    WBC 8.4 4.1 - 11.1 K/uL    RBC 4.78 4.10 - 5.70 M/uL    HGB 14.6 12.1 - 17.0 g/dL    HCT 43.1 36.6 - 50.3 %    MCV 90.2 80.0 - 99.0 FL    MCH 30.5 26.0 - 34.0 PG    MCHC 33.9 30.0 - 36.5 g/dL    RDW 13.6 11.5 - 14.5 %    PLATELET 670 704 - 851 K/uL    MPV 9.1 8.9 - 12.9 FL    NRBC 0.0 0  WBC    ABSOLUTE NRBC 0.00 0.00 - 0.01 K/uL    NEUTROPHILS 62 32 - 75 %    LYMPHOCYTES 34 12 - 49 %    MONOCYTES 3 (L) 5 - 13 %    EOSINOPHILS 1 0 - 7 %    BASOPHILS 1 0 - 1 %    IMMATURE GRANULOCYTES 0 0.0 - 0.5 %    ABS. NEUTROPHILS 5.2 1.8 - 8.0 K/UL    ABS. LYMPHOCYTES 2.8 0.8 - 3.5 K/UL    ABS. MONOCYTES 0.3 0.0 - 1.0 K/UL    ABS. EOSINOPHILS 0.1 0.0 - 0.4 K/UL    ABS. BASOPHILS 0.0 0.0 - 0.1 K/UL    ABS. IMM.  GRANS. 0.0 0.00 - 0.04 K/UL    DF AUTOMATED     METABOLIC PANEL, BASIC    Collection Time: 03/26/18 11:24 PM   Result Value Ref Range    Sodium 142 136 - 145 mmol/L    Potassium 3.3 (L) 3.5 - 5.1 mmol/L    Chloride 106 97 - 108 mmol/L    CO2 27 21 - 32 mmol/L    Anion gap 9 5 - 15 mmol/L    Glucose 97 65 - 100 mg/dL    BUN 13 6 - 20 MG/DL    Creatinine 1.00 0.70 - 1.30 MG/DL    BUN/Creatinine ratio 13 12 - 20      GFR est AA >60 >60 ml/min/1.73m2    GFR est non-AA >60 >60 ml/min/1.73m2    Calcium 8.9 8.5 - 10.1 MG/DL   URINALYSIS W/ REFLEX CULTURE    Collection Time: 03/26/18 11:24 PM   Result Value Ref Range    Color YELLOW/STRAW      Appearance CLEAR CLEAR      Specific gravity <1.005 1.003 - 1.030    pH (UA) 6.0 5.0 - 8.0      Protein NEGATIVE  NEG mg/dL    Glucose NEGATIVE  NEG mg/dL    Ketone NEGATIVE  NEG mg/dL    Bilirubin NEGATIVE  NEG      Blood NEGATIVE  NEG      Urobilinogen 0.2 0.2 - 1.0 EU/dL    Nitrites NEGATIVE  NEG Leukocyte Esterase NEGATIVE  NEG      WBC PENDING /hpf    RBC PENDING /hpf    Epithelial cells PENDING /lpf    Bacteria PENDING /hpf    UA:UC IF INDICATED PENDING        Medical Decision Making   I am the first provider for this patient. I reviewed the vital signs, available nursing notes, past medical history, past surgical history, family history and social history. Vital Signs-Reviewed the patient's vital signs. Patient Vitals for the past 12 hrs:   Temp Pulse Resp BP SpO2   03/26/18 2327 97.1 °F (36.2 °C) 82 16 (!) 129/100 100 %     Records Reviewed: Nursing Notes and Old Medical Records    Provider Notes (Medical Decision Making):   DDx: depression, bipolar, suicidal ideation, homicidal ideation, substance abuse     ED Course:   Initial assessment performed. The patients presenting problems have been discussed, and they are in agreement with the care plan formulated and outlined with them. I have encouraged them to ask questions as they arise throughout their visit. 11:01 PM  Pt evaluated by [facility]. 11:13 PM  [facility] states patient has been depressed since his girlfriend left him last week. They will consult the psychiatrist.      11:56 PM  Dr. Naty Christianson will admit. Disposition:  ADMIT NOTE:  11:56 PM  Patient is being admitted to the hospital by Dr. Naty Christianson. The results of their tests and reasons for their admission have been discussed with them and/or available family. They convey agreement and understanding for the need to be admitted and for their admission diagnosis. Consultation has been made with the inpatient physician specialist for hospitalization. PLAN:  1. Ad[facility] to [facility]    Diagnosis     Clinical Impression:   1. Episode of recurrent major depressive disorder, unspecified depression episode severity ([facility] [address]    2. Substance abuse      Attestations:    Attestation Note:  This note is prepared by Mark  [facility], acting as Scribe for MD Brenton Ward MD: The leatha's documentation has been prepared under my direction and personally reviewed by me in its entirety. I confirm that the note above accurately reflects all work, treatment, procedures, and medical decision making performed by me.

## 2018-03-28 VITALS
OXYGEN SATURATION: 100 % | DIASTOLIC BLOOD PRESSURE: 92 MMHG | BODY MASS INDEX: 20.59 KG/M2 | RESPIRATION RATE: 16 BRPM | SYSTOLIC BLOOD PRESSURE: 134 MMHG | HEIGHT: 72 IN | TEMPERATURE: 98.2 F | HEART RATE: 76 BPM | WEIGHT: 152 LBS

## 2018-03-28 PROCEDURE — 74011250637 HC RX REV CODE- 250/637

## 2018-03-28 RX ADMIN — MULTIPLE VITAMINS W/ MINERALS TAB 1 TABLET: TAB at 08:55

## 2018-03-28 RX ADMIN — FOLIC ACID 1 MG: 1 TABLET ORAL at 08:55

## 2018-03-28 RX ADMIN — Medication 100 MG: at 08:55

## 2018-03-28 NOTE — BH NOTES
Pt observed this shift attending and participating in schedule groups and activities with no problem. Pt meals and meds compliant, interacted with select peers, no behavioral problem noted. Pt denies S/I H/I A/H, affect bright, pleasant upon approach, pt offered no self disclosures. Pt remain on Q 15 min checks for safety will monitor and offer support when needed.

## 2018-03-28 NOTE — BH NOTES
GROUP THERAPY PROGRESS NOTE    The patient Rosalio garza 39 y.o. male is participating in Coping Skills Group. Group time: 45 minutes    Personal goal for participation: To watch relaxation DVD    Goal orientation:  relaxation    Group therapy participation: active    Therapeutic interventions reviewed and discussed: benefits of watching/other relaxation techniques    Impression of participation:  The patient was attentive.     Fabián Donnelly  3/28/2018  1:33 PM

## 2018-03-28 NOTE — DISCHARGE INSTRUCTIONS
.Alka Kimball 35 [phone]  [address]- [phone]

## 2018-03-28 NOTE — PROGRESS NOTES
Problem: Depressed Mood (Adult/Pediatric)  Goal: *STG: Participates in treatment plan  Pt out and visible on unit, minimal interaction with peers and staff. Pt makes fair eye contact. A&O x 4. Affect is blunted, mood is generally pleasant. Hygiene is adequate, independent in ADLs. Gait is steady. Sleep and appetite patterns WNL. No evidence of anxiety. Denies SI/HI. Denies hallucinations at present. Verbally contracts for safety with good eye contact. Pt encouraged to continue to participate in care. Will continue to monitor pt with q 15 min checks.

## 2018-03-28 NOTE — BH NOTES
Behavioral Health Transition Record to Provider    Patient Name: Xiomara Sanchez  YOB: 1975  Medical Record Number: [id number]  Date of Admission: 3/26/2018  Date of Discharge: 3/28/2018    Attending Provider: Mirtha Perdue MD  Discharging Provider: Mirtha Perdue MD  To contact this individual call [phone] and ask the  to page. If unavailable, ask to be transferred to [facility] Provider on call. [facility] Provider will be available on call 24/7 and during holidays. Primary Care Provider: None    Allergies   Allergen Reactions    Shrimp Hives       Reason for Admission:  Pt reports that his girlfriend of 4 years left him unexpectedly and he has felt down and having suicidal thoughts. Pt reports drinking two beers last night for the first time in almost 5 years and smoked crack cocaine last night. Admission Diagnosis: Substance induced mood disorder (HCC)    * No surgery found *    Results for orders placed or performed during the hospital encounter of 03/26/18   CBC WITH AUTOMATED DIFF   Result Value Ref Range    WBC 8.4 4.1 - 11.1 K/uL    RBC 4.78 4.10 - 5.70 M/uL    HGB 14.6 12.1 - 17.0 g/dL    HCT 43.1 36.6 - 50.3 %    MCV 90.2 80.0 - 99.0 FL    MCH 30.5 26.0 - 34.0 PG    MCHC 33.9 30.0 - 36.5 g/dL    RDW 13.6 11.5 - 14.5 %    PLATELET 304 312 - 543 K/uL    MPV 9.1 8.9 - 12.9 FL    NRBC 0.0 0  WBC    ABSOLUTE NRBC 0.00 0.00 - 0.01 K/uL    NEUTROPHILS 62 32 - 75 %    LYMPHOCYTES 34 12 - 49 %    MONOCYTES 3 (L) 5 - 13 %    EOSINOPHILS 1 0 - 7 %    BASOPHILS 1 0 - 1 %    IMMATURE GRANULOCYTES 0 0.0 - 0.5 %    ABS. NEUTROPHILS 5.2 1.8 - 8.0 K/UL    ABS. LYMPHOCYTES 2.8 0.8 - 3.5 K/UL    ABS. MONOCYTES 0.3 0.0 - 1.0 K/UL    ABS. EOSINOPHILS 0.1 0.0 - 0.4 K/UL    ABS. BASOPHILS 0.0 0.0 - 0.1 K/UL    ABS. IMM.  GRANS. 0.0 0.00 - 0.04 K/UL    DF AUTOMATED     METABOLIC PANEL, BASIC   Result Value Ref Range    Sodium 142 136 - 145 mmol/L    Potassium 3.3 (L) 3.5 - 5.1 mmol/L    Chloride 106 97 - 108 mmol/L    CO2 27 21 - 32 mmol/L    Anion gap 9 5 - 15 mmol/L    Glucose 97 65 - 100 mg/dL    BUN 13 6 - 20 MG/DL    Creatinine 1.00 0.70 - 1.30 MG/DL    BUN/Creatinine ratio 13 12 - 20      GFR est AA >60 >60 ml/min/1.73m2    GFR est non-AA >60 >60 ml/min/1.73m2    Calcium 8.9 8.5 - 10.1 MG/DL   ETHYL ALCOHOL   Result Value Ref Range    ALCOHOL(ETHYL),SERUM 205 (H) <10 MG/DL   DRUG SCREEN, URINE   Result Value Ref Range    AMPHETAMINES NEGATIVE  NEG      BARBITURATES NEGATIVE  NEG      BENZODIAZEPINES NEGATIVE  NEG      COCAINE POSITIVE (A) NEG      METHADONE NEGATIVE  NEG      OPIATES NEGATIVE  NEG      PCP(PHENCYCLIDINE) NEGATIVE  NEG      THC (TH-CANNABINOL) NEGATIVE  NEG      Drug screen comment (NOTE)    URINALYSIS W/ REFLEX CULTURE   Result Value Ref Range    Color YELLOW/STRAW      Appearance CLEAR CLEAR      Specific gravity <1.005 1.003 - 1.030    pH (UA) 6.0 5.0 - 8.0      Protein NEGATIVE  NEG mg/dL    Glucose NEGATIVE  NEG mg/dL    Ketone NEGATIVE  NEG mg/dL    Bilirubin NEGATIVE  NEG      Blood NEGATIVE  NEG      Urobilinogen 0.2 0.2 - 1.0 EU/dL    Nitrites NEGATIVE  NEG      Leukocyte Esterase NEGATIVE  NEG      WBC 0-4 0 - 4 /hpf    RBC 0-5 0 - 5 /hpf    Epithelial cells FEW FEW /lpf    Bacteria NEGATIVE  NEG /hpf    UA:UC IF INDICATED CULTURE NOT INDICATED BY UA RESULT CNI         Immunizations administered during this encounter:   Immunization History   Administered Date(s) Administered    TD Vaccine 07/03/2011       Screening for Metabolic Disorders for Patients on Antipsychotic Medications  (Data obtained from the EMR)    Estimated Body Mass Index  Estimated body mass index is 20.61 kg/(m^2) as calculated from the following:    Height as of this encounter: 6' (1.829 m). Weight as of this encounter: 68.9 kg (152 lb).      Vital Signs/Blood Pressure  Visit Vitals    BP (!) 134/92 (BP 1 Location: Left arm, BP Patient Position: At rest)    Pulse 76    Temp 98.2 °F (36.8 °C)    Resp 16    Ht 6' (1.829 m)    Wt 68.9 kg (152 lb)    SpO2 100%    BMI 20.61 kg/m2       Blood Glucose/Hemoglobin A1c  Lab Results   Component Value Date/Time    Glucose 97 03/26/2018 11:24 PM    Glucose (POC) 98 10/16/2013 08:49 PM       No results found for: HBA1C, HGBE8, HOV4FDVY     Lipid Panel  No results found for: CHOL, CHOLX, CHLST, CHOLV, 675125, HDL, LDL, LDLC, DLDLP, TGLX, TRIGL, TRIGP, CHHD, CHHDX     Discharge Diagnosis: Please refer to physician's discharge summary. Discharge Plan: Pt will be returning to his home and transported by volunteer transportation. Discharge Medication List and Instructions: There are no discharge medications for this patient. Unresulted Labs     None        To obtain results of studies pending at discharge, please contact N/A. Follow-up Information     Follow up With Details Comments [facility] Go to Rapid access appointment 8:00AM -11:00AM & 12:00PM-3:00PM for mental health and substance abuse services. [address] [facility]  [phone]  Fax: [phone]        None   None (395) Patient stated that they have no PCP            Advanced Directive:   Does the patient have an appointed surrogate decision maker? Unknown   Does the patient have a Medical Advance Directive? Unknown   Does the patient have a Psychiatric Advance Directive? Unknown   If the patient does not have a surrogate or Medical Advance Directive AND Psychiatric Advance Directive, the patient was offered information on these advance directives Unknown      Patient Instructions: Please continue all medications until otherwise directed by physician. Tobacco Cessation Discharge Plan:   Is the patient a smoker and needs referral for smoking cessation? No  Patient referred to the following for smoking cessation with an appointment?  No   Patient was offered medication to assist with smoking cessation at discharge? No  Was education for smoking cessation added to the discharge instructions? No    Alcohol/Substance Abuse Discharge Plan:   Does the patient have a history of substance/alcohol abuse and requires a referral for treatment? Yes  Patient referred to the following for substance/alcohol abuse treatment with an appointment? Yes  Patient was offered medication to assist with alcohol cessation at discharge? No  Was education for substance/alcohol abuse added to discharge instructions? Yes    Patient discharged to Home; provided to the patient/caregiver either in hard copy or electronically. Continuing care paperwork was faxed to community mental health providers. Pt is alert and oriented. Pt denies SI/HI/AVH. Pt's mood was euthymic. Pt's thought process was linear and goal oriented. Pt's insight and judgment are fair. Pt is in agreement with the plan.

## 2018-03-28 NOTE — BH NOTES
Discharge instructions were given and explained and the patient verbalized an understanding. Staff verified the instructions. The patient did not have any home medications to be returned. All valuables and other belongings were returned upon discharge. Staff took patient off the unit.

## 2018-03-28 NOTE — DISCHARGE SUMMARY
PSYCHIATRIC DISCHARGE SUMMARY         IDENTIFICATION:    Patient Name  Mary Grace Barrett   Date of Birth 1975   [facility] [id number]   Medical Record Number  [id number]      Age  39 y.o. PCP None   Admit date:  3/26/2018    Discharge date: 3/28/2018   Room Number  323/02  @ [facility]   Date of Service  3/28/2018            TYPE OF DISCHARGE: REGULAR               CONDITION AT DISCHARGE: fair       PROVISIONAL & DISCHARGE DIAGNOSES:    Problem List  Date Reviewed: 10/17/2013          Codes Class    Sub[facility] ([facility] ICD-10-CM: F19.94  ICD-9-CM: 292.84         Depressive disorder, not elsewhere classified ICD-10-CM: F32.9  ICD-9-CM: 311         Polysubstance abuse ICD-10-CM: F19.10  ICD-9-CM: 305.90               Active Hospital Problems    Substance induced mood disorder ([facility].)        DISCHARGE DIAGNOSIS:   Axis I:  SEE ABOVE  Axis II: SEE ABOVE  Axis III: SEE ABOVE  Axis IV:  lack of structure  Axis V:  35 on admission, 55 on discharge 60(baseline)       CC & HISTORY OF PRESENT ILLNESS:  CC: Pt admitted under a voluntary basis for severe depression with suicidal ideations proving to be an imminent danger to self and an inability to care for self. HISTORY OF PRESENT ILLNESS:    The patient, Mary Grace Barrett, is a 39 y.o. BLACK OR  male with a past psychiatric history significant for depression and drinking alcohol , who presents at this time with complaints of (and/or evidence of) the following emotional symptoms: depression and anxiety. Additional symptomatology include alcohol abuse and feeling depressed. The above symptoms have been present for days . These symptoms are of mild severity. These symptoms are constant  in nature. The patient's condition has been precipitated by breaking up with girlfriend and psychosocial stressors (issues in relationship  ).   Patient's condition made worse by continued illicit drug use and alcohol use as well as treatment noncompliance. UDS: Positive ; BAL=205   3/28/18 he is doing better and no SI or HI and no psychotic features and no aggressive behavior        SOCIAL HISTORY:    Social History     Social History    Marital status:      Spouse name: N/A    Number of children: N/A    Years of education: N/A     Occupational History    Not on file. Social History Main Topics    Smoking status: Current Every Day Smoker     Packs/day: 0.50    Smokeless tobacco: Never Used    Alcohol use Yes    Drug use: Yes     Special: Cocaine      Comment: crack cocaine tonight 3/26/18    Sexual activity: Yes     Other Topics Concern    Not on file     Social History Narrative      FAMILY HISTORY:   Family History   Problem Relation Age of Onset    Hypertension Mother     Heart Disease Father              HOSPITALIZATION COURSE:    Mary Grace Barrett was admitted to the inpatient psychiatric unit [facility] for acute psychiatric stabilization in regards to symptomatology as described in the HPI above. The differential diagnosis at time of admission included: adjustment disorder   While on the unit Mary Grace Barrett was involved in individual, group, occupational and milieu therapy. Psychiatric medications were adjusted during this hospitalization including therapy . Mary Grace Barrett demonstrated a slow, but progressive improvement in overall condition. Much of patient's depression appeared to be related to situational stressors, effects of drugs of abuse, and psychological factors. Please see individual progress notes for more specific details regarding patient's hospitalization course. At time of discharge, Mary Grace Barrett is without significant problems of depression psychosis  flako. Patient free of suicidal and homicidal ideations (appears to be at very low risk of suicide or homicide) and reports many positive predictive factors in terms of not attempting suicide or homicide.  Overall presentation at time of discharge is most consistent with the diagnosis of adjustment disorder with depressed mood. Patient with request for discharge today. There are no grounds to seek a TDO. Patient has maximized benefit to be derived from acute inpatient psychiatric treatment. All members of the treatment team concur with each other in regards to plans for discharge today per patient's request.  Patient and family are aware and in agreement with discharge and discharge plan.     Per my last note:          LABS AND IMAGAING:    Labs Reviewed   CBC WITH AUTOMATED DIFF - Abnormal; Notable for the following:        Result Value    MONOCYTES 3 (*)     All other components within normal limits   METABOLIC PANEL, BASIC - Abnormal; Notable for the following:     Potassium 3.3 (*)     All other components within normal limits   ETHYL ALCOHOL - Abnormal; Notable for the following:     ALCOHOL(ETHYL),SERUM 205 (*)     All other components within normal limits   DRUG SCREEN, URINE - Abnormal; Notable for the following:     COCAINE POSITIVE (*)     All other components within normal limits   URINALYSIS W/ REFLEX CULTURE     No results found for: DS35, PHEN, PHENO, PHENT, DILF, DS39, PHENY, PTN, VALF2, VALAC, VALP, VALPR, DS6, CRBAM, CRBAMP, CARB2, XCRBAM  Admission on 03/26/2018   Component Date Value Ref Range Status    WBC 03/26/2018 8.4  4.1 - 11.1 K/uL Final    RBC 03/26/2018 4.78  4.10 - 5.70 M/uL Final    HGB 03/26/2018 14.6  12.1 - 17.0 g/dL Final    HCT 03/26/2018 43.1  36.6 - 50.3 % Final    MCV 03/26/2018 90.2  80.0 - 99.0 FL Final    MCH 03/26/2018 30.5  26.0 - 34.0 PG Final    MCHC 03/26/2018 33.9  30.0 - 36.5 g/dL Final    RDW 03/26/2018 13.6  11.5 - 14.5 % Final    PLATELET 31/33/3634 206  150 - 400 K/uL Final    MPV 03/26/2018 9.1  8.9 - 12.9 FL Final    NRBC 03/26/2018 0.0  0  WBC Final    ABSOLUTE NRBC 03/26/2018 0.00  0.00 - 0.01 K/uL Final    NEUTROPHILS 03/26/2018 62  32 - 75 % Final    LYMPHOCYTES 03/26/2018 34  12 - 49 % Final    MONOCYTES 03/26/2018 3* 5 - 13 % Final    EOSINOPHILS 03/26/2018 1  0 - 7 % Final    BASOPHILS 03/26/2018 1  0 - 1 % Final    IMMATURE GRANULOCYTES 03/26/2018 0  0.0 - 0.5 % Final    ABS. NEUTROPHILS 03/26/2018 5.2  1.8 - 8.0 K/UL Final    ABS. LYMPHOCYTES 03/26/2018 2.8  0.8 - 3.5 K/UL Final    ABS. MONOCYTES 03/26/2018 0.3  0.0 - 1.0 K/UL Final    ABS. EOSINOPHILS 03/26/2018 0.1  0.0 - 0.4 K/UL Final    ABS. BASOPHILS 03/26/2018 0.0  0.0 - 0.1 K/UL Final    ABS. IMM.  GRANS. 03/26/2018 0.0  0.00 - 0.04 K/UL Final    DF 03/26/2018 AUTOMATED    Final    Sodium 03/26/2018 142  136 - 145 mmol/L Final    Potassium 03/26/2018 3.3* 3.5 - 5.1 mmol/L Final    Chloride 03/26/2018 106  97 - 108 mmol/L Final    CO2 03/26/2018 27  21 - 32 mmol/L Final    Anion gap 03/26/2018 9  5 - 15 mmol/L Final    Glucose 03/26/2018 97  65 - 100 mg/dL Final    BUN 03/26/2018 13  6 - 20 MG/DL Final    Creatinine 03/26/2018 1.00  0.70 - 1.30 MG/DL Final    BUN/Creatinine ratio 03/26/2018 13  12 - 20   Final    GFR est AA 03/26/2018 >60  >60 ml/min/1.73m2 Final    GFR est non-AA 03/26/2018 >60  >60 ml/min/1.73m2 Final    Calcium 03/26/2018 8.9  8.5 - 10.1 MG/DL Final    ALCOHOL(ETHYL),SERUM 03/26/2018 205* <10 MG/DL Final    AMPHETAMINES 03/26/2018 NEGATIVE   NEG   Final    BARBITURATES 03/26/2018 NEGATIVE   NEG   Final    BENZODIAZEPINES 03/26/2018 NEGATIVE   NEG   Final    COCAINE 03/26/2018 POSITIVE* NEG   Final    METHADONE 03/26/2018 NEGATIVE   NEG   Final    OPIATES 03/26/2018 NEGATIVE   NEG   Final    PCP(PHENCYCLIDINE) 03/26/2018 NEGATIVE   NEG   Final    THC (TH-CANNABINOL) 03/26/2018 NEGATIVE   NEG   Final    Drug screen comment 03/26/2018 (NOTE)   Final    Color 03/26/2018 YELLOW/STRAW    Final    Appearance 03/26/2018 CLEAR  CLEAR   Final    Specific gravity 03/26/2018 <1.005  1.003 - 1.030 Final    pH (UA) 03/26/2018 6.0  5.0 - 8.0   Final    Protein 03/26/2018 NEGATIVE   NEG mg/dL Final    Glucose 03/26/2018 NEGATIVE   NEG mg/dL Final    Ketone 03/26/2018 NEGATIVE   NEG mg/dL Final    Bilirubin 03/26/2018 NEGATIVE   NEG   Final    Blood 03/26/2018 NEGATIVE   NEG   Final    Urobilinogen 03/26/2018 0.2  0.2 - 1.0 EU/dL Final    Nitrites 03/26/2018 NEGATIVE   NEG   Final    Leukocyte Esterase 03/26/2018 NEGATIVE   NEG   Final    WBC 03/26/2018 0-4  0 - 4 /hpf Final    RBC 03/26/2018 0-5  0 - 5 /hpf Final    Epithelial cells 03/26/2018 FEW  FEW /lpf Final    Bacteria 03/26/2018 NEGATIVE   NEG /hpf Final    UA:UC IF INDICATED 03/26/2018 CULTURE NOT INDICATED BY UA RESULT  CNI   Final     No results found. DISPOSITION:    Home. Patient to f/u with drug/etoh rehabilitation, psychiatric, and psychotherapy appointments. Patient is to f/u with internist as directed. .               FOLLOW-UP CARE:    Activity as tolerated  Regular Diet  Wound Care: none needed. Follow-up Information     Follow up With Details Comments [facility] Go to Rapid access appointment 8:00AM -11:00AM & 12:00PM-3:00PM for mental health and substance abuse services. [address] [facility]  [phone]  Fax: [phone]        None   None (395) Patient stated that they have no PCP                   PROGNOSIS:   Fair ---- based on nature of patient's pathology/ies and treatment compliance issues. Prognosis is greatly dependent upon patient's ability to remain sober and to follow up with drug/etoh rehabilitation and psychiatric/psychotherapy appointments as well as to comply with psychiatric medications as prescribed. DISCHARGE MEDICATIONS: (no changes made). Informed consent given for the use of following psychotropic medications: There are no discharge medications for this patient.              A coordinated, multidisplinary treatment team round was conducted with Ana Maria Avila is done daily here at [facility]. This team consists of the nurse, psychiatric unit pharmcist,  and writer. I have spent greater than 35 minutes on discharge work.     Signed:  Cruz Ferreira MD  3/28/2018

## 2018-03-28 NOTE — BH NOTES
Pt observed resting comfortably in bed with eyes closed, breathing even and unlabored. No s/s of distress noted, no complaints voiced.  Pt slept 6 hours, will continue to monitor for safety, Q15.

## 2018-03-28 NOTE — CONSULTS
[address]  MR#: [id number]  : 1975  ACCOUNT #: [de-identified]   DATE OF SERVICE: 2018    REFERRING PHYSICIAN:  Min Yanez MD.    REASON FOR CONSULTATION:  Medical evaluation, psychiatric admission. CHIEF COMPLAINT:  Suicidal and homicidal ideation with substance abuse. HISTORY OF PRESENT ILLNESS:  A 59-year-old male presents suicidal, homicidal after relapsing on crack cocaine. He denies any chest pain, shortness of breath, nausea, vomiting or diarrhea. He does not have a primary care physician. PAST MEDICAL HISTORY:  Depression, substance abuse. PAST SURGICAL HISTORY:  None. ALLERGIES:  SHRIMP. MEDICATIONS: None. SOCIAL HISTORY:  He does smoke half a pack of cigarettes a day. He says he actually binged on alcohol after being clean for 5 years. In addition, he relapsed on crack cocaine after being clean for 5 years. Denies any heroin and marijuana use. , works with a [email or web address], has 1 child, no grandchildren. PHYSICAL EXAMINATION:  VITAL SIGNS:  Temperature 97.7, blood pressure 127/89, pulse 79, respirations 16, pulse ox 100%. Weight 152 pounds. GENERAL:  Pleasant, in no acute distress. HEENT:  Oropharynx is clear. NECK:  Supple. LUNGS:  Clear to auscultation. No wheeze, rales, rhonchi. CARDIOVASCULAR:  Regular rate. No murmurs, gallops or rubs. ABDOMEN:  Soft, nontender, nondistended. Active bowel sounds. No hepatosplenomegaly. EXTREMITIES:  No cyanosis, clubbing or edema. LABORATORY DATA:  Hemoglobin 14.6, hematocrit 43.1. UA was negative. Sodium 142, potassium 3.3, chloride 106, bicarbonate 27, BUN was 13, creatinine 1.0, glucose 97. Tox screen positive for cocaine. Alcohol level is 205.     IMPRESSION:  This is a 59-year-old male with past medical history of depression and substance abuse, presents with suicidal ideation and homicidal ideation and relapse of alcohol and cocaine use, admitted for further psychiatric evaluation and treatment. PLAN:  1. Psychiatric management of mental health issues. 2.  Psychiatry management of substance withdrawal symptoms. 3.  Recheck potassium, replete as indicated. 4.  Medically stable. Will follow up on a p.r.n. basis. 5.  No VTE prophylaxis indicated or warranted at this time. Thank you for this consult.       Estella Oglesby MD DC / LAUREN  D: 03/28/2018 07:37     T: 03/28/2018 08:01  JOB #: [id number]

## 2018-04-01 ENCOUNTER — HOSPITAL ENCOUNTER (INPATIENT)
Age: 43
LOS: 1 days | Discharge: HOME | DRG: 882 | End: 2018-04-03
Attending: EMERGENCY MEDICINE | Admitting: PSYCHIATRY & NEUROLOGY
Payer: SELF-PAY

## 2018-04-01 DIAGNOSIS — K52.9 GASTROENTERITIS: Primary | ICD-10-CM

## 2018-04-01 LAB
AMPHET UR QL SCN: NEGATIVE
ANION GAP SERPL CALC-SCNC: 11 MMOL/L (ref 5–15)
APPEARANCE UR: CLEAR
BACTERIA URNS QL MICRO: NEGATIVE /HPF
BARBITURATES UR QL SCN: NEGATIVE
BASOPHILS # BLD: 0 K/UL (ref 0–0.1)
BASOPHILS NFR BLD: 1 % (ref 0–1)
BENZODIAZ UR QL: NEGATIVE
BILIRUB UR QL: NEGATIVE
BUN SERPL-MCNC: 13 MG/DL (ref 6–20)
BUN/CREAT SERPL: 14 (ref 12–20)
CALCIUM SERPL-MCNC: 8.8 MG/DL (ref 8.5–10.1)
CANNABINOIDS UR QL SCN: NEGATIVE
CHLORIDE SERPL-SCNC: 109 MMOL/L (ref 97–108)
CO2 SERPL-SCNC: 26 MMOL/L (ref 21–32)
COCAINE UR QL SCN: POSITIVE
COLOR UR: NORMAL
CREAT SERPL-MCNC: 0.95 MG/DL (ref 0.7–1.3)
DIFFERENTIAL METHOD BLD: NORMAL
DRUG SCRN COMMENT,DRGCM: ABNORMAL
EOSINOPHIL # BLD: 0.1 K/UL (ref 0–0.4)
EOSINOPHIL NFR BLD: 1 % (ref 0–7)
EPITH CASTS URNS QL MICRO: NORMAL /LPF
ERYTHROCYTE [DISTWIDTH] IN BLOOD BY AUTOMATED COUNT: 13.7 % (ref 11.5–14.5)
ETHANOL SERPL-MCNC: 240 MG/DL
GLUCOSE SERPL-MCNC: 97 MG/DL (ref 65–100)
GLUCOSE UR STRIP.AUTO-MCNC: NEGATIVE MG/DL
HCT VFR BLD AUTO: 42.7 % (ref 36.6–50.3)
HGB BLD-MCNC: 14.7 G/DL (ref 12.1–17)
HGB UR QL STRIP: NEGATIVE
IMM GRANULOCYTES # BLD: 0 K/UL (ref 0–0.04)
IMM GRANULOCYTES NFR BLD AUTO: 0 % (ref 0–0.5)
KETONES UR QL STRIP.AUTO: NEGATIVE MG/DL
LEUKOCYTE ESTERASE UR QL STRIP.AUTO: NEGATIVE
LYMPHOCYTES # BLD: 3.3 K/UL (ref 0.8–3.5)
LYMPHOCYTES NFR BLD: 43 % (ref 12–49)
MCH RBC QN AUTO: 30.8 PG (ref 26–34)
MCHC RBC AUTO-ENTMCNC: 34.4 G/DL (ref 30–36.5)
MCV RBC AUTO: 89.5 FL (ref 80–99)
METHADONE UR QL: NEGATIVE
MONOCYTES # BLD: 0.4 K/UL (ref 0–1)
MONOCYTES NFR BLD: 5 % (ref 5–13)
NEUTS SEG # BLD: 3.8 K/UL (ref 1.8–8)
NEUTS SEG NFR BLD: 50 % (ref 32–75)
NITRITE UR QL STRIP.AUTO: NEGATIVE
NRBC # BLD: 0 K/UL (ref 0–0.01)
NRBC BLD-RTO: 0 PER 100 WBC
OPIATES UR QL: NEGATIVE
PCP UR QL: NEGATIVE
PH UR STRIP: 6 [PH] (ref 5–8)
PLATELET # BLD AUTO: 209 K/UL (ref 150–400)
PMV BLD AUTO: 9 FL (ref 8.9–12.9)
POTASSIUM SERPL-SCNC: 3.3 MMOL/L (ref 3.5–5.1)
PROT UR STRIP-MCNC: NEGATIVE MG/DL
RBC # BLD AUTO: 4.77 M/UL (ref 4.1–5.7)
RBC #/AREA URNS HPF: NORMAL /HPF (ref 0–5)
SODIUM SERPL-SCNC: 146 MMOL/L (ref 136–145)
SP GR UR REFRACTOMETRY: <1.005 (ref 1–1.03)
UA: UC IF INDICATED,UAUC: NORMAL
UROBILINOGEN UR QL STRIP.AUTO: 0.2 EU/DL (ref 0.2–1)
WBC # BLD AUTO: 7.6 K/UL (ref 4.1–11.1)
WBC URNS QL MICRO: NORMAL /HPF (ref 0–4)

## 2018-04-01 PROCEDURE — 80307 DRUG TEST PRSMV CHEM ANLYZR: CPT | Performed by: EMERGENCY MEDICINE

## 2018-04-01 PROCEDURE — 96365 THER/PROPH/DIAG IV INF INIT: CPT

## 2018-04-01 PROCEDURE — 85025 COMPLETE CBC W/AUTO DIFF WBC: CPT | Performed by: EMERGENCY MEDICINE

## 2018-04-01 PROCEDURE — 81001 URINALYSIS AUTO W/SCOPE: CPT | Performed by: EMERGENCY MEDICINE

## 2018-04-01 PROCEDURE — 99285 EMERGENCY DEPT VISIT HI MDM: CPT

## 2018-04-01 PROCEDURE — 90791 PSYCH DIAGNOSTIC EVALUATION: CPT

## 2018-04-01 PROCEDURE — 74011000250 HC RX REV CODE- 250: Performed by: EMERGENCY MEDICINE

## 2018-04-01 PROCEDURE — 74011250636 HC RX REV CODE- 250/636: Performed by: EMERGENCY MEDICINE

## 2018-04-01 PROCEDURE — 80048 BASIC METABOLIC PNL TOTAL CA: CPT | Performed by: EMERGENCY MEDICINE

## 2018-04-01 PROCEDURE — 36415 COLL VENOUS BLD VENIPUNCTURE: CPT | Performed by: EMERGENCY MEDICINE

## 2018-04-01 RX ADMIN — FOLIC ACID: 5 INJECTION, SOLUTION INTRAMUSCULAR; INTRAVENOUS; SUBCUTANEOUS at 23:15

## 2018-04-01 NOTE — IP AVS SNAPSHOT
[address] [facility] 
 
 
 [address] [place] [address] Patient: Domingo Bailey MRN: [id number] LFF:[birth year] A check tatiana indicates which time of day the medication should be taken. My Medications START taking these medications Instructions Each Dose to Equal  
 Morning Noon Evening Bedtime  
 loperamide 2 mg tablet Commonly known as:  IMODIUM A-D Your last dose was: Your next dose is: Take 1 Tab by mouth four (4) times daily as needed for Diarrhea for up to 10 days. 2 mg  
    
   
   
   
  
 ondansetron 4 mg disintegrating tablet Commonly known as:  ZOFRAN ODT Your last dose was: Your next dose is: Take 1 Tab by mouth every eight (8) hours as needed for Nausea. 4 mg Where to Get Your Medications Information on where to get these meds will be given to you by the nurse or doctor. ! Ask your nurse or doctor about these medications  
  loperamide 2 mg tablet  
 ondansetron 4 mg disintegrating tablet

## 2018-04-01 NOTE — IP AVS SNAPSHOT
[facility] 
 
 
 [address] Patient: Isis Hare MRN: [id number] About your hospitalization You were admitted on:  April 2, 2018 You last received care in the:  [address] You were discharged on:  April 3, 2018 Why you were hospitalized Your primary diagnosis was:  Not on File Your diagnoses also included:  Mdd (Major Depressive Disorder) Follow-up Information Follow up With Details Comments Contact Info DIMITRY Go on 4/17/2018 Substance abuse case management appointment on 4/17/18 at 10:30 AM with Xean Salas. [address] [place] [address] 
[phone] Fax: [phone] [facility] Oliverio Ocampo  Go in 1 day Intake appointment for mental health services. Due to the limited slots, you may want to arrive by 7:00am. The doors open at 7:30am and you should sign in at the registration desk. [facility] [address] Hours: Monday-Friday 8:30am- 12:00PM & 1:00PM-4:30pm  
[phone] (phone) [phone] (fax) [facility]  Go on 4/5/2018 Please attend Recovery Support Groups on Mondays & Thursdays at 2:00 PM  Address: [address] [place] [address], [employer] Phone Number: [phone] Discharge Orders None A check tatiana indicates which time of day the medication should be taken. My Medications START taking these medications Instructions Each Dose to Equal  
 Morning Noon Evening Bedtime  
 loperamide 2 mg tablet Commonly known as:  IMODIUM A-D Your last dose was: Your next dose is: Take 1 Tab by mouth four (4) times daily as needed for Diarrhea for up to 10 days. 2 mg  
    
   
   
   
  
 ondansetron 4 mg disintegrating tablet Commonly known as:  ZOFRAN ODT Your last dose was: Your next dose is: Take 1 Tab by mouth every eight (8) hours as needed for Nausea. 4 mg Where to Get Your Medications Information on where to get these meds will be given to you by the nurse or doctor. ! Ask your nurse or doctor about these medications  
  loperamide 2 mg tablet  
 ondansetron 4 mg disintegrating tablet Discharge Instructions Depression and Chronic Disease: Care Instructions Your Care Instructions A chronic disease is one that you have for a long time. Some chronic diseases can be controlled, but they usually cannot be cured. Depression is common in people with chronic diseases, but it often goes unnoticed. Many people have concerns about seeking treatment for a mental health problem. You may think it's a sign of weakness, or you don't want people to know about it. It's important to overcome these reasons for not seeking treatment. Treating depression or anxiety is good for your health. Follow-up care is a key part of your treatment and safety. Be sure to make and go to all appointments, and call your doctor if you are having problems. It's also a good idea to know your test results and keep a list of the medicines you take. How can you care for yourself at home? Watch for symptoms of depression The symptoms of depression are often subtle at first. You may think they are caused by your disease rather than depression. Or you may think it is normal to be depressed when you have a chronic disease. If you are depressed you may: · Feel sad or hopeless. · Feel guilty or worthless. · Not enjoy the things you used to enjoy. · Feel hopeless, as though life is not worth living. · Have trouble thinking or remembering. · Have low energy, and you may not eat or sleep well. · Pull away from others. · Think often about death or killing yourself.  (Keep the numbers for these national suicide hotlines: 0-065-756-TALK [1-536.160.8854] and 0-546-FQFYIPL [1-818-594-7881]. ) Get treatment By treating your depression, you can feel more hopeful and have more energy. If you feel better, you may take better care of yourself, so your health may improve. · Talk to your doctor if you have any changes in mood during treatment for your disease. · Ask your doctor for help. Counseling, antidepressant medicine, or a combination of the two can help most people with depression. Often a combination works best. Counseling can also help you cope with having a chronic disease. When should you call for help? Call 911 anytime you think you may need emergency care. For example, call if: 
? · You feel like hurting yourself or someone else. ? · Someone you know has depression and is about to attempt or is attempting suicide. ?Call your doctor now or seek immediate medical care if: 
? · You hear voices. ? · Someone you know has depression and: 
¨ Starts to give away his or her possessions. ¨ Uses illegal drugs or drinks alcohol heavily. ¨ Talks or writes about death, including writing suicide notes or talking about guns, knives, or pills. ¨ Starts to spend a lot of time alone. ¨ Acts very aggressively or suddenly appears calm. ? Watch closely for changes in your health, and be sure to contact your doctor if: 
? · You do not get better as expected. Where can you learn more? Go to http://grace-faustina.info/. Enter T781 in the search box to learn more about \"Depression and Chronic Disease: Care Instructions. \" Current as of: May 12, 2017 Content Version: 11.4 © 6793-2191 [employer]. Care instructions adapted under license by [employer] (which disclaims liability or warranty for this information). If you have questions about a medical condition or this instruction, always ask your healthcare professional. [address] any warranty or liability for your use of this information. Gastroenteritis: Care Instructions Your Care Instructions Gastroenteritis is an illness that may cause nausea, vomiting, and diarrhea. It is sometimes called \"stomach flu. \" It can be caused by bacteria or a virus. You will probably begin to feel better in 1 to 2 days. In the meantime, get plenty of rest and make sure you do not become dehydrated. Dehydration occurs when your body loses too much fluid. Follow-up care is a key part of your treatment and safety. Be sure to make and go to all appointments, and call your doctor if you are having problems. It's also a good idea to know your test results and keep a list of the medicines you take. How can you care for yourself at home? · If your doctor prescribed antibiotics, take them as directed. Do not stop taking them just because you feel better. You need to take the full course of antibiotics. · Drink plenty of fluids to prevent dehydration, enough so that your urine is light yellow or clear like water. Choose water and other caffeine-free clear liquids until you feel better. If you have kidney, heart, or liver disease and have to limit fluids, talk with your doctor before you increase your fluid intake. · Drink fluids slowly, in frequent, small amounts, because drinking too much too fast can cause vomiting. · Begin eating mild foods, such as dry toast, yogurt, applesauce, bananas, and rice. Avoid spicy, hot, or high-fat foods, and do not drink alcohol or caffeine for a day or two. Do not drink milk or eat ice cream until you are feeling better. How to prevent gastroenteritis · Keep hot foods hot and cold foods cold. · Do not eat meats, dressings, salads, or other foods that have been kept at room temperature for more than 2 hours. · Use a thermometer to check your refrigerator. It should be between 34°F and 40°F. 
· Defrost meats in the refrigerator or microwave, not on the kitchen counter. · Keep your hands and your kitchen clean. Wash your hands, cutting boards, and countertops with hot soapy water frequently. · Cook meat until it is well done. · Do not eat raw eggs or uncooked sauces made with raw eggs. · Do not take chances. If food looks or tastes spoiled, throw it out. When should you call for help? Call 911 anytime you think you may need emergency care. For example, call if: 
? · You vomit blood or what looks like coffee grounds. ? · You passed out (lost consciousness). ? · You pass maroon or very bloody stools. ?Call your doctor now or seek immediate medical care if: 
? · You have severe belly pain. ? · You have signs of needing more fluids. You have sunken eyes, a dry mouth, and pass only a little dark urine. ? · You feel like you are going to faint. ? · You have increased belly pain that does not go away in 1 to 2 days. ? · You have new or increased nausea, or you are vomiting. ? · You have a new or higher fever. ? · Your stools are black and tarlike or have streaks of blood. ? Watch closely for changes in your health, and be sure to contact your doctor if: 
? · You are dizzy or lightheaded. ? · You urinate less than usual, or your urine is dark yellow or brown. ? · You do not feel better with each day that goes by. Where can you learn more? Go to http://grace-faustina.info/. Enter N142 in the search box to learn more about \"Gastroenteritis: Care Instructions. \" Current as of: March 3, 2017 Content Version: 11.4 © 4626-6600 [employer]. Care instructions adapted under license by [employer] (which disclaims liability or warranty for this information). If you have questions about a medical condition or this instruction, always ask your healthcare professional. [address] any warranty or liability for your use of this information. Introducing [facility] & HEALTH SERVICES! [facility] introduces Interactif Visuel SystÃ¨met patient portal. Now you can access parts of your medical record, email your doctor's office, and request medication refills online. 1. In your internet browser, go to [email or web address] 2. Click on the First Time User? Click Here link in the Sign In box. You will see the New Member Sign Up page. 3. Enter your SwiftKey Access Code exactly as it appears below. You will not need to use this code after youve completed the sign-up process. If you do not sign up before the expiration date, you must request a new code. · SwiftKey Access Code: [email or web address] Expires: 6/26/2018 11:21 AM 
 
4. Enter the last four digits of your Social Security Number (xxxx) and Date of Birth (mm/dd/yyyy) as indicated and click Submit. You will be taken to the next sign-up page. 5. Create a SwiftKey ID. This will be your SwiftKey login ID and cannot be changed, so think of one that is secure and easy to remember. 6. Create a SwiftKey password. You can change your password at any time. 7. Enter your Password Reset Question and Answer. This can be used at a later time if you forget your password. 8. Enter your e-mail address. You will receive e-mail notification when new information is available in [address] 9. Click Sign Up. You can now view and download portions of your medical record. 10. Click the Download Summary menu link to download a portable copy of your medical information. If you have questions, please visit the Frequently Asked Questions section of the SwiftKey website. Remember, SwiftKey is NOT to be used for urgent needs. For medical emergencies, dial 911. Now available from your iPhone and Android! Introducing Eron Wheeler As a [facility] patient, I wanted to make you aware of our electronic visit tool called Eron Wheeler. [facility] 24/7 allows you to connect within minutes with a medical provider 24 hours a day, seven days a week via a mobile device or tablet or logging into a secure website from your computer. You can access Shirley Mae's from anywhere in the [place]. A virtual visit might be right for you when you have a simple condition and feel like you just dont want to get out of bed, or cant get away from work for an appointment, when your regular Chava Platts provider is not available (evenings, weekends or holidays), or when youre out of town and need minor care. Electronic visits cost only $49 and if the [facility] 24/7 provider determines a prescription is needed to treat your condition, one can be electronically transmitted to a nearby pharmacy*. Please take a moment to enroll today if you have not already done so. The enrollment process is free and takes just a few minutes. To enroll, please download the [facility] 24/Ceram Hyd karo to your tablet or phone, or visit [email or web address] to enroll on your computer. And, as an [address] [place] [address] patient with a [employer] account, the results of your visits will be scanned into your electronic medical record and your primary care provider will be able to view the scanned results. We urge you to continue to see your regular Chava Platts provider for your ongoing medical care. And while your primary care provider may not be the one available when you seek a Eron Emcoreivafin virtual visit, the peace of mind you get from getting a real diagnosis real time can be priceless. For more information on Sooliganivafin, view our Frequently Asked Questions (FAQs) at [email or web address]. Sincerely, 
 
Jan Arredondo MD 
Chief Medical Officer [address] *:  certain medications cannot be prescribed via Sooliganjesus Providers Seen During Your Hospitalization Provider Specialty Primary office phone Jose D Tariq MD Emergency Medicine [phone] Bin Montez MD Psychiatry [phone] Carmen Fong MD Psychiatry [phone] Your Primary Care Physician (PCP) Primary Care Physician Office Phone Office Fax NONE ** None ** ** None ** You are allergic to the following Allergen Reactions Shrimp Hives Recent Documentation Height Weight BMI Smoking Status 1.829 m 66.7 kg 19.94 kg/m2 Current Every Day Smoker Emergency Contacts Name Discharge Info Relation Home Work Mobile Vilma Jolly DISCHARGE CAREGIVER [3] Girlfriend [18] [phone] Patient Belongings The following personal items are in your possession at time of discharge: 
  Dental Appliances: None  Visual Aid: None      Home Medications: None   Jewelry: None  Clothing: Belt, Footwear, Hat, Jacket/Coat, Pants    Other Valuables: Cell Phone (, ID) Please provide this summary of care documentation to your next provider. Signatures-by signing, you are acknowledging that this After Visit Summary has been reviewed with you and you have received a copy. Patient Signature:  ____________________________________________________________ Date:  ____________________________________________________________  
  
Isaias Reyes Provider Signature:  ____________________________________________________________ Date:  ____________________________________________________________

## 2018-04-01 NOTE — IP AVS SNAPSHOT
Summary of Care Report The Summary of Care report has been created to help improve care coordination. Users with access to [facility] or [address] (Web-based application) may access additional patient information including the Discharge Summary. If you are not currently a [address] user and need more information, please call the number listed below in the [place] [address] section and ask to be connected with Medical Records. Facility Information Name Address Phone [address] [place] [id number] [phone] Patient Information Patient Name Sex JERRY Pulido ([id number]) Male 1975 Discharge Information Admitting Provider Service Area Unit Lazaro Duff MD / [address] / [phone] Discharge Provider Discharge Date/Time Discharge Disposition Destination Tanner Chavez MD / [phone] [id number] 1256 [facility] (none) Patient Language Language ENGLISH [13] Hospital Problems as of 4/3/2018  Reviewed: 10/17/2013 12:32 PM by Geneva Viramontes NP Class Noted - Resolved Last Modified POA Active Problems MDD (major depressive disorder)  2018 - Present 2018 by Lazaro Duff MD Unknown Entered by Lazaro Duff MD  
  
Non-Hospital Problems as of 4/3/2018  Reviewed: 10/17/2013 12:32 PM by Geneva Viramontes NP Class Noted - Resolved Last Modified Active Problems Depressive disorder, not elsewhere classified  10/17/2013 - Present 10/17/2013 by Geneva Viramontes NP Entered by Geneva Viramontes NP Polysubstance abuse  10/17/2013 - Present 10/17/2013 by Geneva Viramontes NP Entered by Geneva Viramontes NP   Substance induced mood disorder ([facility] [address]  3/27/2018 - Present 3/27/2018 by Frankey Fat, MD  
  Entered by Frankey Fat, MD  
 You are allergic to the following Allergen Reactions Shrimp Hives Current Discharge Medication List  
  
START taking these medications Dose & Instructions Dispensing Information Comments  
 loperamide 2 mg tablet Commonly known as:  IMODIUM A-D Dose:  2 mg Take 1 Tab by mouth four (4) times daily as needed for Diarrhea for up to 10 days. Quantity:  12 Tab Refills:  0  
   
 ondansetron 4 mg disintegrating tablet Commonly known as:  ZOFRAN ODT Dose:  4 mg Take 1 Tab by mouth every eight (8) hours as needed for Nausea. Quantity:  10 Tab Refills:  0 Current Immunizations Name Date Influenza Vaccine PF  Deferred ()  
 TD Vaccine 7/3/2011 Follow-up Information Follow up With Details Comments Contact Info [facility] Go on 4/17/2018 Substance abuse case management appointment on 4/17/18 at 10:30 AM with Noé Gibson. [address] [place] [address] 
[phone] Fax: [phone] [address] [place] [address] Go in 1 day Intake appointment for mental health services. Due to the limited slots, you may want to arrive by 7:00am. The doors open at 7:30am and you should sign in at the registration desk. [address] [place] [address] Hours: Monday-Friday 8:30am- 12:00PM & 1:00PM-4:30pm  
[phone] (phone) [phone] (fax) [facility]  Go on 4/5/2018 Please attend Recovery Support Groups on Mondays & Thursdays at 2:00 PM  Address: [address] [place] [address], [address] Phone Number: [phone] Discharge Instructions Depression and Chronic Disease: Care Instructions Your Care Instructions A chronic disease is one that you have for a long time. Some chronic diseases can be controlled, but they usually cannot be cured. Depression is common in people with chronic diseases, but it often goes unnoticed. Many people have concerns about seeking treatment for a mental health problem. You may think it's a sign of weakness, or you don't want people to know about it. It's important to overcome these reasons for not seeking treatment. Treating depression or anxiety is good for your health. Follow-up care is a key part of your treatment and safety. Be sure to make and go to all appointments, and call your doctor if you are having problems. It's also a good idea to know your test results and keep a list of the medicines you take. How can you care for yourself at home? Watch for symptoms of depression The symptoms of depression are often subtle at first. You may think they are caused by your disease rather than depression. Or you may think it is normal to be depressed when you have a chronic disease. If you are depressed you may: · Feel sad or hopeless. · Feel guilty or worthless. · Not enjoy the things you used to enjoy. · Feel hopeless, as though life is not worth living. · Have trouble thinking or remembering. · Have low energy, and you may not eat or sleep well. · Pull away from others. · Think often about death or killing yourself. (Keep the numbers for these national suicide hotlines: 3-164-137-TALK [1-352.411.1977] and 6-532-ASSDAGY [1-620.649.3646]. ) Get treatment By treating your depression, you can feel more hopeful and have more energy. If you feel better, you may take better care of yourself, so your health may improve. · Talk to your doctor if you have any changes in mood during treatment for your disease. · Ask your doctor for help. Counseling, antidepressant medicine, or a combination of the two can help most people with depression. Often a combination works best. Counseling can also help you cope with having a chronic disease. When should you call for help? Call 911 anytime you think you may need emergency care. For example, call if: 
? · You feel like hurting yourself or someone else. ? · Someone you know has depression and is about to attempt or is attempting suicide. ?Call your doctor now or seek immediate medical care if: 
? · You hear voices. ? · Someone you know has depression and: 
¨ Starts to give away his or her possessions. ¨ Uses illegal drugs or drinks alcohol heavily. ¨ Talks or writes about death, including writing suicide notes or talking about guns, knives, or pills. ¨ Starts to spend a lot of time alone. ¨ Acts very aggressively or suddenly appears calm. ? Watch closely for changes in your health, and be sure to contact your doctor if: 
? · You do not get better as expected. Where can you learn more? Go to http://graceFitbitfaustina.info/. Enter T700 in the search box to learn more about \"Depression and Chronic Disease: Care Instructions. \" Current as of: May 12, 2017 Content Version: 11.4 © 6547-3812 [employer]. Care instructions adapted under license by [employer] (which disclaims liability or warranty for this information). If you have questions about a medical condition or this instruction, always ask your healthcare professional. [place] [address] any warranty or liability for your use of this information. Gastroenteritis: Care Instructions Your Care Instructions Gastroenteritis is an illness that may cause nausea, vomiting, and diarrhea. It is sometimes called \"stomach flu. \" It can be caused by bacteria or a virus. You will probably begin to feel better in 1 to 2 days. In the meantime, get plenty of rest and make sure you do not become dehydrated. Dehydration occurs when your body loses too much fluid. Follow-up care is a key part of your treatment and safety. Be sure to make and go to all appointments, and call your doctor if you are having problems. It's also a good idea to know your test results and keep a list of the medicines you take. How can you care for yourself at home? · If your doctor prescribed antibiotics, take them as directed. Do not stop taking them just because you feel better. You need to take the full course of antibiotics. · Drink plenty of fluids to prevent dehydration, enough so that your urine is light yellow or clear like water. Choose water and other caffeine-free clear liquids until you feel better. If you have kidney, heart, or liver disease and have to limit fluids, talk with your doctor before you increase your fluid intake. · Drink fluids slowly, in frequent, small amounts, because drinking too much too fast can cause vomiting. · Begin eating mild foods, such as dry toast, yogurt, applesauce, bananas, and rice. Avoid spicy, hot, or high-fat foods, and do not drink alcohol or caffeine for a day or two. Do not drink milk or eat ice cream until you are feeling better. How to prevent gastroenteritis · Keep hot foods hot and cold foods cold. · Do not eat meats, dressings, salads, or other foods that have been kept at room temperature for more than 2 hours. · Use a thermometer to check your refrigerator. It should be between 34°F and 40°F. 
· Defrost meats in the refrigerator or microwave, not on the kitchen counter. · Keep your hands and your kitchen clean. Wash your hands, cutting boards, and countertops with hot soapy water frequently. · Cook meat until it is well done. · Do not eat raw eggs or uncooked sauces made with raw eggs. · Do not take chances. If food looks or tastes spoiled, throw it out. When should you call for help? Call 911 anytime you think you may need emergency care. For example, call if: 
? · You vomit blood or what looks like coffee grounds. ? · You passed out (lost consciousness). ? · You pass maroon or very bloody stools. ?Call your doctor now or seek immediate medical care if: 
? · You have severe belly pain. ? · You have signs of needing more fluids. You have sunken eyes, a dry mouth, and pass only a little dark urine. ? · You feel like you are going to faint. ? · You have increased belly pain that does not go away in 1 to 2 days. ? · You have new or increased nausea, or you are vomiting. ? · You have a new or higher fever. ? · Your stools are black and tarlike or have streaks of blood. ? Watch closely for changes in your health, and be sure to contact your doctor if: 
? · You are dizzy or lightheaded. ? · You urinate less than usual, or your urine is dark yellow or brown. ? · You do not feel better with each day that goes by. Where can you learn more? Go to http://grace-faustina.info/. Enter N142 in the search box to learn more about \"Gastroenteritis: Care Instructions. \" Current as of: March 3, 2017 Content Version: 11.4 © 5117-4725 [employer]. Care instructions adapted under license by [employer] (which disclaims liability or warranty for this information). If you have questions about a medical condition or this instruction, always ask your healthcare professional. [place] [address] any warranty or liability for your use of this information. Chart Review Routing History No Routing History on File

## 2018-04-02 PROBLEM — F32.9 MDD (MAJOR DEPRESSIVE DISORDER): Status: ACTIVE | Noted: 2018-04-02

## 2018-04-02 PROCEDURE — 74011250637 HC RX REV CODE- 250/637: Performed by: PSYCHIATRY & NEUROLOGY

## 2018-04-02 PROCEDURE — 65220000003 HC RM SEMIPRIVATE PSYCH

## 2018-04-02 PROCEDURE — 74011250637 HC RX REV CODE- 250/637: Performed by: EMERGENCY MEDICINE

## 2018-04-02 RX ORDER — LOPERAMIDE HYDROCHLORIDE 2 MG/1
2 CAPSULE ORAL
Status: ACTIVE | OUTPATIENT
Start: 2018-04-02 | End: 2018-04-02

## 2018-04-02 RX ORDER — BENZTROPINE MESYLATE 1 MG/ML
2 INJECTION INTRAMUSCULAR; INTRAVENOUS
Status: DISCONTINUED | OUTPATIENT
Start: 2018-04-02 | End: 2018-04-03 | Stop reason: HOSPADM

## 2018-04-02 RX ORDER — BENZTROPINE MESYLATE 2 MG/1
2 TABLET ORAL
Status: DISCONTINUED | OUTPATIENT
Start: 2018-04-02 | End: 2018-04-03 | Stop reason: HOSPADM

## 2018-04-02 RX ORDER — IBUPROFEN 200 MG
1 TABLET ORAL
Status: DISCONTINUED | OUTPATIENT
Start: 2018-04-02 | End: 2018-04-03 | Stop reason: HOSPADM

## 2018-04-02 RX ORDER — ASPIRIN 325 MG/1
100 TABLET, FILM COATED ORAL DAILY
Status: DISCONTINUED | OUTPATIENT
Start: 2018-04-02 | End: 2018-04-03 | Stop reason: HOSPADM

## 2018-04-02 RX ORDER — LORAZEPAM 2 MG/ML
2 INJECTION INTRAMUSCULAR
Status: DISCONTINUED | OUTPATIENT
Start: 2018-04-02 | End: 2018-04-03 | Stop reason: HOSPADM

## 2018-04-02 RX ORDER — ACETAMINOPHEN 325 MG/1
650 TABLET ORAL
Status: DISCONTINUED | OUTPATIENT
Start: 2018-04-02 | End: 2018-04-03 | Stop reason: HOSPADM

## 2018-04-02 RX ORDER — LANOLIN ALCOHOL/MO/W.PET/CERES
100 CREAM (GRAM) TOPICAL DAILY
Status: DISCONTINUED | OUTPATIENT
Start: 2018-04-02 | End: 2018-04-02

## 2018-04-02 RX ORDER — ONDANSETRON 4 MG/1
4 TABLET, ORALLY DISINTEGRATING ORAL
Qty: 10 TAB | Refills: 0 | Status: SHIPPED | OUTPATIENT
Start: 2018-04-02

## 2018-04-02 RX ORDER — OLANZAPINE 5 MG/1
5 TABLET ORAL
Status: DISCONTINUED | OUTPATIENT
Start: 2018-04-02 | End: 2018-04-03 | Stop reason: HOSPADM

## 2018-04-02 RX ORDER — IBUPROFEN 400 MG/1
400 TABLET ORAL
Status: DISCONTINUED | OUTPATIENT
Start: 2018-04-02 | End: 2018-04-03 | Stop reason: HOSPADM

## 2018-04-02 RX ORDER — ZOLPIDEM TARTRATE 10 MG/1
10 TABLET ORAL
Status: DISCONTINUED | OUTPATIENT
Start: 2018-04-02 | End: 2018-04-03 | Stop reason: HOSPADM

## 2018-04-02 RX ORDER — FOLIC ACID 1 MG/1
1 TABLET ORAL DAILY
Status: DISCONTINUED | OUTPATIENT
Start: 2018-04-02 | End: 2018-04-03 | Stop reason: HOSPADM

## 2018-04-02 RX ORDER — LOPERAMIDE HCL 2 MG
2 TABLET ORAL
Qty: 12 TAB | Refills: 0 | Status: SHIPPED | OUTPATIENT
Start: 2018-04-02 | End: 2018-04-12

## 2018-04-02 RX ORDER — ADHESIVE BANDAGE
30 BANDAGE TOPICAL DAILY PRN
Status: DISCONTINUED | OUTPATIENT
Start: 2018-04-02 | End: 2018-04-03 | Stop reason: HOSPADM

## 2018-04-02 RX ORDER — POTASSIUM CHLORIDE 750 MG/1
40 TABLET, FILM COATED, EXTENDED RELEASE ORAL
Status: COMPLETED | OUTPATIENT
Start: 2018-04-02 | End: 2018-04-02

## 2018-04-02 RX ORDER — LORAZEPAM 1 MG/1
1 TABLET ORAL
Status: DISCONTINUED | OUTPATIENT
Start: 2018-04-02 | End: 2018-04-03 | Stop reason: HOSPADM

## 2018-04-02 RX ORDER — ONDANSETRON 4 MG/1
8 TABLET, ORALLY DISINTEGRATING ORAL
Status: ACTIVE | OUTPATIENT
Start: 2018-04-02 | End: 2018-04-02

## 2018-04-02 RX ADMIN — MULTIPLE VITAMINS W/ MINERALS TAB 1 TABLET: TAB at 08:50

## 2018-04-02 RX ADMIN — FOLIC ACID 1 MG: 1 TABLET ORAL at 08:50

## 2018-04-02 RX ADMIN — Medication 100 MG: at 09:05

## 2018-04-02 RX ADMIN — POTASSIUM CHLORIDE 40 MEQ: 750 TABLET, FILM COATED, EXTENDED RELEASE ORAL at 00:47

## 2018-04-02 NOTE — PROGRESS NOTES
[facility] Admission Pharmacy Medication Reconciliation     Recommendations/Findings:   1) Patient was just discharged from [facility] on 3/28/18 and was not discharged on any medications. Per medication reconciliation completed on 3/26/18, patient was not taking any prescription or OTC medications. Total Time Spent: 5 minutes    Information obtained from: Chart review, RxQuery    Past Medical History/Disease States:  Past Medical History:   Diagnosis Date    Psychiatric disorder     depression, substance abuse     Patient allergies:    Allergies as of 04/01/2018 - Review Complete 04/01/2018   Allergen Reaction Noted    Shrimp Hives 03/26/2018       Prior to admission medications:  None       Thank you,  Lisset Sutherland, PHARMD, BCPS

## 2018-04-02 NOTE — BH NOTES
Pt admitted with depression and si. UDS +Cocaine, , K+ 3.3, pt received \"banana bag\" and K 40mEq in ED prior to admission to general unit. Pt brought in by EMS after being found in cemetary with plan to commit suicide by slitting his throat. Pt admitted this was his plan earlier but he denies depression and si presently. Pt guarded but cooperative with assessment. Skin search conducted by Hemanth Bowling results in tattoos on chest, abdomen and forearm. Pt denies pain, no complaints voiced. Pt received sandwich, crackers and water. Will conduct safety checks Q15.

## 2018-04-02 NOTE — BH NOTES
PSYCHOSOCIAL ASSESSMENT  :Patient identifying info: Bola Whyte is a 39 y.o., male admitted 4/1/2018  7:52 PM       Presenting problem and precipitating factors:  He was released from [facility] on 3/28/2018 with plan to follow up with [facility] for treatment. Patient indicates that he has an appointment for 4/18/2018 and would like to meet with  at a sooner date. Pt reported to the treatment team that he started back drinking last week, reporting that he drank 3-4 beers yesterday and used crack cocaine. He has been using crack on an off for 20 years but reports that he only recently relapsed on alcohol since the breakup. Pt reported that he is back together with his girlfriend of 4 years and moving into new home with her but per [facility], \"He thought that he could reconcile with her by now but girlfriend has moved all of her personal belongings out of the shared apartment and patient indicates that he has been staying with his mother. \"     Mental status assessment:  Pt is alert and oriented. Pt denies SI/HI. Pt's mood is anxious, affect is anxious. Pt's thought process is logical.  Pt's insight and judgment are limited, reliability is fair.       Current psychiatric providers and contact info: Pt went to intake appointment to begin substance abuse services at [facility] last week.       Previous psychiatric services/providers and response to treatment: Patient was recently hospitalized at [facility] on 3/27/2018 Patient was previously hospitalized in 2013 at [facility] for substance abuse when he reports he was using regularly.      Family history of mental illness: None reported.      Substance abuse history: Patient reported he relapsed today using alcohol and crack cocaine.   UDS +cocaine, BAL= 240         Social History   Substance Use Topics    Smoking status: Current Every Day Smoker       Packs/day: 0.50    Smokeless tobacco: Never Used    Alcohol use Yes          Family constellation: Pt is single and has a 10year old daughter that lives with mother.      Is significant other involved? No.      Describe support system: Pt reports that his girlfriend is his source of support.      Describe living arrangements and home environment:  Pt reports living alone on the [facility] side of [place]. Health issues:   Hospital Problems  Date Reviewed: 10/17/2013             Codes Class Noted POA     Substance induced mood disorder [facility]) ICD-10-CM: F19.94  ICD-9-CM: 292.84   3/27/2018 Unknown                  Trauma history: None reported     Legal issues: No legal issues pending.      History of  service: None reported.     Financial status: Employment.      Caodaism/cultural factors: None expressed at this time.      Education/work history:  Pt works for a [employer] and has a 7th grade education.      Have you been licensed as a zachary care professional (current or ): No.   Leisure and recreation preferences: Spending time with his daughter.    Describe coping skills: Coping skills are ineffectual.  His insight and judgement are impaired.     Teofilo Ng  2018

## 2018-04-02 NOTE — H&P
History and Physical    Subjective:     Renetta Sam is a 39 y. o. with no significant past medical hx. Pt admitted under a voluntary basis for depression with suicidal ideations poving to be an imminent danger to self and an inability to care for self. Per psych documentation, Pt is a [address] male with a past psychiatric history significant for depression and substance abuse is admitted at this time with complaints of (and/or evidence of) the following emotional symptoms: depression, suicidal thoughts/threats and anxiety. Additional symptomatology include anxiety, depression worse, feeling depressed and feeling suicidal.  The above symptoms have been present for weeks. These symptoms are of moderate severity. These symptoms are intermittent/ fleeting in nature. The patient's condition has been precipitated by issues in relationship and psychosocial stressors (financial issues  ). Patient's condition made worse by continued illicit drug use and alcohol use as well as treatment noncompliance. UDS: Positive  BAL=240 , he stated he was drunk and he made the suicidal statement while he was drunk but he is now not feeling suicidal at all. Pt denies any acute medical issues. Pt is showing no signs of acute distress. Past Medical History:   Diagnosis Date    Psychiatric disorder     depression, substance abuse      PSHx:none declared by pt. Family History   Problem Relation Age of Onset    Hypertension Mother     Heart Disease Father       Social History   Substance Use Topics    Smoking status: Current Every Day Smoker     Packs/day: 0.50    Smokeless tobacco: Never Used    Alcohol use Yes       Prior to Admission medications    Medication Sig Start Date End Date Taking? Authorizing Provider   ondansetron (ZOFRAN ODT) 4 mg disintegrating tablet Take 1 Tab by mouth every eight (8) hours as needed for Nausea.  4/2/18  Yes Genet Sánchez MD   loperamide (IMODIUM A-D) 2 mg tablet Take 1 Tab by mouth four (4) times daily as needed for Diarrhea for up to 10 days. 4/2/18 4/12/18 Yes Bella Chris MD     Allergies   Allergen Reactions    Shrimp Hives        Review of Systems:  Constitutional: negative  Eyes: negative  Ears, Nose, Mouth, Throat, and Face: negative  Respiratory: negative  Cardiovascular: negative  Gastrointestinal: negative  Genitourinary:negative  Integument/Breast: negative  Hematologic/Lymphatic: negative  Musculoskeletal:negative  Neurological: negative  Behavioral/Psychiatric: MDD  Endocrine: negative  Allergic/Immunologic: negative     Objective: Intake and Output:            Physical Exam:   Visit Vitals    BP (!) 136/96 (BP 1 Location: Left arm, BP Patient Position: At rest)    Pulse 79    Temp 98 °F (36.7 °C)    Resp 16    Ht 6' (1.829 m)    Wt 66.7 kg (147 lb)    SpO2 100%    BMI 19.94 kg/m2     General:  Alert, cooperative, no distress, appears stated age. Head:  Normocephalic, without obvious abnormality, atraumatic. Eyes:  Conjunctivae/corneas clear. PERRL, EOMs intact. Ears:  Normal external ear canals both ears. Nose: Nares normal. Septum midline. Mucosa normal. No drainage or sinus tenderness. Throat: Lips, mucosa, and tongue normal. Teeth and gums normal.   Neck: Supple, symmetrical, trachea midline, no adenopathy, thyroid: no enlargement/tenderness/nodules, no carotid bruit and no JVD. Back:   Symmetric, no curvature. ROM normal. No CVA tenderness. Lungs:   Clear to auscultation bilaterally. Chest wall:  No tenderness or deformity. Heart:  Regular rate and rhythm, S1, S2 normal, no murmur, click, rub or gallop. Abdomen:   Soft, non-tender. Bowel sounds normal. No masses,  No organomegaly. Extremities: Extremities normal, atraumatic, no cyanosis or edema. Pulses: 2+ and symmetric all extremities.    Skin: Skin color, texture, turgor normal. No rashes or lesions   Lymph nodes: Cervical, supraclavicular, and axillary nodes normal.   Neurologic: CNII-XII intact. Normal strength, sensation and reflexes throughout. ECG:  {Findings; ec::\"normal EKG, normal sinus rhythm\",\"uncha    Data Review:   Recent Results (from the past 24 hour(s))   CBC WITH AUTOMATED DIFF    Collection Time: 18  8:29 PM   Result Value Ref Range    WBC 7.6 4.1 - 11.1 K/uL    RBC 4.77 4.10 - 5.70 M/uL    HGB 14.7 12.1 - 17.0 g/dL    HCT 42.7 36.6 - 50.3 %    MCV 89.5 80.0 - 99.0 FL    MCH 30.8 26.0 - 34.0 PG    MCHC 34.4 30.0 - 36.5 g/dL    RDW 13.7 11.5 - 14.5 %    PLATELET 258 997 - 227 K/uL    MPV 9.0 8.9 - 12.9 FL    NRBC 0.0 0  WBC    ABSOLUTE NRBC 0.00 0.00 - 0.01 K/uL    NEUTROPHILS 50 32 - 75 %    LYMPHOCYTES 43 12 - 49 %    MONOCYTES 5 5 - 13 %    EOSINOPHILS 1 0 - 7 %    BASOPHILS 1 0 - 1 %    IMMATURE GRANULOCYTES 0 0.0 - 0.5 %    ABS. NEUTROPHILS 3.8 1.8 - 8.0 K/UL    ABS. LYMPHOCYTES 3.3 0.8 - 3.5 K/UL    ABS. MONOCYTES 0.4 0.0 - 1.0 K/UL    ABS. EOSINOPHILS 0.1 0.0 - 0.4 K/UL    ABS. BASOPHILS 0.0 0.0 - 0.1 K/UL    ABS. IMM.  GRANS. 0.0 0.00 - 0.04 K/UL    DF AUTOMATED     METABOLIC PANEL, BASIC    Collection Time: 18  8:29 PM   Result Value Ref Range    Sodium 146 (H) 136 - 145 mmol/L    Potassium 3.3 (L) 3.5 - 5.1 mmol/L    Chloride 109 (H) 97 - 108 mmol/L    CO2 26 21 - 32 mmol/L    Anion gap 11 5 - 15 mmol/L    Glucose 97 65 - 100 mg/dL    BUN 13 6 - 20 MG/DL    Creatinine 0.95 0.70 - 1.30 MG/DL    BUN/Creatinine ratio 14 12 - 20      GFR est AA >60 >60 ml/min/1.73m2    GFR est non-AA >60 >60 ml/min/1.73m2    Calcium 8.8 8.5 - 10.1 MG/DL   DRUG SCREEN, URINE    Collection Time: 18  8:29 PM   Result Value Ref Range    AMPHETAMINES NEGATIVE  NEG      BARBITURATES NEGATIVE  NEG      BENZODIAZEPINES NEGATIVE  NEG      COCAINE POSITIVE (A) NEG      METHADONE NEGATIVE  NEG      OPIATES NEGATIVE  NEG      PCP(PHENCYCLIDINE) NEGATIVE  NEG      THC (TH-CANNABINOL) NEGATIVE  NEG      Drug screen comment (NOTE)    URINALYSIS W/ REFLEX CULTURE    Collection Time: 04/01/18  8:29 PM   Result Value Ref Range    Color YELLOW/STRAW      Appearance CLEAR CLEAR      Specific gravity <1.005 1.003 - 1.030    pH (UA) 6.0 5.0 - 8.0      Protein NEGATIVE  NEG mg/dL    Glucose NEGATIVE  NEG mg/dL    Ketone NEGATIVE  NEG mg/dL    Bilirubin NEGATIVE  NEG      Blood NEGATIVE  NEG      Urobilinogen 0.2 0.2 - 1.0 EU/dL    Nitrites NEGATIVE  NEG      Leukocyte Esterase NEGATIVE  NEG      WBC 0-4 0 - 4 /hpf    RBC 0-5 0 - 5 /hpf    Epithelial cells FEW FEW /lpf    Bacteria NEGATIVE  NEG /hpf    UA:UC IF INDICATED CULTURE NOT INDICATED BY UA RESULT CNI     ETHYL ALCOHOL    Collection Time: 04/01/18  8:29 PM   Result Value Ref Range    ALCOHOL(ETHYL),SERUM 240 (H) <10 MG/DL       Assessment:     Active Problems:    MDD (major depressive disorder) (4/2/2018)    Substance abuse    Plan:     Cont psych stabilization. MVI/Folate/thiamine. No VTE prophylaxis indicated or necessary at this time.    Signed By: Carlos Zambrano MD     April 2, 2018

## 2018-04-02 NOTE — ED NOTES
This RN assumes role as preceptor to Linda Joiner RN. This RN reviews all assessments, charting, medication administration, and skills performed by the preceptee.

## 2018-04-02 NOTE — ED NOTES
Pt presents to ED via EMS complaining of Mental Health Problem x 1 week. Per EMS pt was found in a graveyard reporting suicidal ideations. Pt reports his girlfriend left him a week ago and he's been depressed ever since. Pt reports SI over the past few days but denies plan. Pt denies hallucinations, delusion, or homicidal ideations. Pt reports cocaine and alcohol use over the past week. Pt is alert and oriented x 4, RR even and unlabored, skin is warm and dry. Assessment completed and pt updated on plan of care. Emergency Department Nursing Plan of Care       The Nursing Plan of Care is developed from the Nursing assessment and Emergency Department Attending provider initial evaluation. The plan of care may be reviewed in the ED Provider note.     The Plan of Care was developed with the following considerations:   Patient / Family readiness to learn indicated by:verbalized understanding  Persons(s) to be included in education: patient  Barriers to Learning/Limitations:No    Signed     Matthew Olivas    4/2/2018   12:35 AM

## 2018-04-02 NOTE — ED PROVIDER NOTES
EMERGENCY DEPARTMENT HISTORY AND PHYSICAL EXAM      Date: 4/1/2018  Patient Name: Bola Whyte    History of Presenting Illness     Chief Complaint   Patient presents with    Mental Health Problem     x 1 week       History Provided By: Patient and EMS    HPI: Bola Whyte, 39 y.o. male with PMHx significant for depression, substance abuse, presents via EMS to the ED with c/o SI x 1 week. Per EMS, patient's girlfriend left him 1 week ago and he has been increasingly depressed since. They [place] Once EMS arrived, patient stated he had a plan of suicide, but wasn't sure what the plan was. EMS further adds [employer] did not find any weapons on patient. Patient does endorse crack cocaine use, but denies any heroin use. Hx and ROS limited given patient's current condition and psychiatric illness. Past History     Past Medical History:  Past Medical History:   Diagnosis Date    Psychiatric disorder     depression, substance abuse       Past Surgical History:  No past surgical history on file. Family History:  Family History   Problem Relation Age of Onset    Hypertension Mother     Heart Disease Father        Social History:  Social History   Substance Use Topics    Smoking status: Current Every Day Smoker     Packs/day: 0.50    Smokeless tobacco: Never Used    Alcohol use Yes       Allergies: Allergies   Allergen Reactions    Shrimp Hives         Review of Systems   Review of Systems   Unable to perform ROS: Psychiatric disorder       Physical Exam   Physical Exam   Constitutional: He is oriented to person, place, and time. He appears well-developed and well-nourished. No distress. HENT:   Head: Normocephalic and atraumatic. Mouth/Throat: Oropharynx is clear and moist. No oropharyngeal exudate or posterior oropharyngeal erythema. Neck: Normal range of motion and full passive range of motion without pain. Neck supple.    Cardiovascular: Normal rate, regular rhythm, normal heart sounds, intact distal pulses and normal pulses. Exam reveals no gallop and no friction rub. No murmur heard. Pulmonary/Chest: Effort normal and breath sounds normal. No accessory muscle usage. No respiratory distress. He has no decreased breath sounds. He has no wheezes. He has no rhonchi. He has no rales. Abdominal: Soft. Bowel sounds are normal. He exhibits no distension. There is no tenderness. There is no rebound, no guarding and no CVA tenderness. Musculoskeletal: Normal range of motion. He exhibits no edema or tenderness. Thoracic back: He exhibits no tenderness and no bony tenderness. Lumbar back: He exhibits no tenderness and no bony tenderness. Lymphadenopathy:     He has no cervical adenopathy. Neurological: He is alert and oriented to person, place, and time. He has normal strength. He is not disoriented. No cranial nerve deficit or sensory deficit. No focal deficits; 5/5 muscle strength in all extremities   Skin: Skin is warm. No lesion and no rash noted. Rash is not nodular. He is not diaphoretic. Psychiatric: He exhibits a depressed mood. He expresses suicidal ideation. He expresses no homicidal ideation. Nursing note and vitals reviewed. Diagnostic Study Results     Labs -     Recent Results (from the past 12 hour(s))   CBC WITH AUTOMATED DIFF    Collection Time: 04/01/18  8:29 PM   Result Value Ref Range    WBC 7.6 4.1 - 11.1 K/uL    RBC 4.77 4.10 - 5.70 M/uL    HGB 14.7 12.1 - 17.0 g/dL    HCT 42.7 36.6 - 50.3 %    MCV 89.5 80.0 - 99.0 FL    MCH 30.8 26.0 - 34.0 PG    MCHC 34.4 30.0 - 36.5 g/dL    RDW 13.7 11.5 - 14.5 %    PLATELET 057 532 - 566 K/uL    MPV 9.0 8.9 - 12.9 FL    NRBC 0.0 0  WBC    ABSOLUTE NRBC 0.00 0.00 - 0.01 K/uL    NEUTROPHILS 50 32 - 75 %    LYMPHOCYTES 43 12 - 49 %    MONOCYTES 5 5 - 13 %    EOSINOPHILS 1 0 - 7 %    BASOPHILS 1 0 - 1 %    IMMATURE GRANULOCYTES 0 0.0 - 0.5 %    ABS.  NEUTROPHILS 3.8 1.8 - 8.0 K/UL    ABS. LYMPHOCYTES 3.3 0.8 - 3.5 K/UL    ABS. MONOCYTES 0.4 0.0 - 1.0 K/UL    ABS. EOSINOPHILS 0.1 0.0 - 0.4 K/UL    ABS. BASOPHILS 0.0 0.0 - 0.1 K/UL    ABS. IMM.  GRANS. 0.0 0.00 - 0.04 K/UL    DF AUTOMATED     METABOLIC PANEL, BASIC    Collection Time: 04/01/18  8:29 PM   Result Value Ref Range    Sodium 146 (H) 136 - 145 mmol/L    Potassium 3.3 (L) 3.5 - 5.1 mmol/L    Chloride 109 (H) 97 - 108 mmol/L    CO2 26 21 - 32 mmol/L    Anion gap 11 5 - 15 mmol/L    Glucose 97 65 - 100 mg/dL    BUN 13 6 - 20 MG/DL    Creatinine 0.95 0.70 - 1.30 MG/DL    BUN/Creatinine ratio 14 12 - 20      GFR est AA >60 >60 ml/min/1.73m2    GFR est non-AA >60 >60 ml/min/1.73m2    Calcium 8.8 8.5 - 10.1 MG/DL   DRUG SCREEN, URINE    Collection Time: 04/01/18  8:29 PM   Result Value Ref Range    AMPHETAMINES NEGATIVE  NEG      BARBITURATES NEGATIVE  NEG      BENZODIAZEPINES NEGATIVE  NEG      COCAINE POSITIVE (A) NEG      METHADONE NEGATIVE  NEG      OPIATES NEGATIVE  NEG      PCP(PHENCYCLIDINE) NEGATIVE  NEG      THC (TH-CANNABINOL) NEGATIVE  NEG      Drug screen comment (NOTE)    URINALYSIS W/ REFLEX CULTURE    Collection Time: 04/01/18  8:29 PM   Result Value Ref Range    Color YELLOW/STRAW      Appearance CLEAR CLEAR      Specific gravity <1.005 1.003 - 1.030    pH (UA) 6.0 5.0 - 8.0      Protein NEGATIVE  NEG mg/dL    Glucose NEGATIVE  NEG mg/dL    Ketone NEGATIVE  NEG mg/dL    Bilirubin NEGATIVE  NEG      Blood NEGATIVE  NEG      Urobilinogen 0.2 0.2 - 1.0 EU/dL    Nitrites NEGATIVE  NEG      Leukocyte Esterase NEGATIVE  NEG      WBC 0-4 0 - 4 /hpf    RBC 0-5 0 - 5 /hpf    Epithelial cells FEW FEW /lpf    Bacteria NEGATIVE  NEG /hpf    UA:UC IF INDICATED CULTURE NOT INDICATED BY UA RESULT CNI     ETHYL ALCOHOL    Collection Time: 04/01/18  8:29 PM   Result Value Ref Range    ALCOHOL(ETHYL),SERUM 240 (H) <10 MG/DL       Radiologic Studies -   No orders to display       Medical Decision Making   I am the first provider for this patient. I reviewed the vital signs, available nursing notes, past medical history, past surgical history, family history and social history. Vital Signs-Reviewed the patient's vital signs. Patient Vitals for the past 12 hrs:   Temp Pulse Resp BP SpO2   04/01/18 1953 97.8 °F (36.6 °C) 92 18 137/87 100 %     Records Reviewed: Nursing Notes, Old Medical Records, Ambulance Run Sheet and Previous Laboratory Studies    Provider Notes (Medical Decision Making):   DDx: SI, depression, substance induced mood disorder, schizoaffective disorder    ED Course:   Initial assessment performed. The patients presenting problems have been discussed, and they are in agreement with the care plan formulated and outlined with them. I have encouraged them to ask questions as they arise throughout their visit. PROGRESS NOTE  9:46 PM   RN spoke with Dr. Nabil Raines. He is asking we order an EKG and multivitamin infusion before he admits patient. Admission Note:  11:00 PM  Patient is being admitted to the hospital by Dr. Nabil Raines. The results of their tests and reasons for their admission have been discussed with them and available family. They convey agreement and understanding for the need to be admitted and for their admission diagnosis. PLAN:  1. Admit to Psychiatry    Diagnosis     Clinical Impression: No diagnosis found. Attestations: This note is prepared by Lencho England, acting as Scribe for Ashley Win MD.    The scribe's documentation has been prepared under my direction and personally reviewed by me in its entirety. I confirm that the note above accurately reflects all work, treatment, procedures, and medical decision making performed by me.   Ashley Win MD

## 2018-04-02 NOTE — ED NOTES
TRANSFER - OUT REPORT:    Verbal report given to Brinda Mckeon RN (name) on Kym Bonilla  being transferred to [facility] (unit) for routine progression of care       Report consisted of patients Situation, Background, Assessment and   Recommendations(SBAR). Information from the following report(s) SBAR, Kardex, ED Summary, [facility] and Recent Results was reviewed with the receiving nurse. Lines:       Opportunity for questions and clarification was provided.       Patient transported with:   Lety Delgado

## 2018-04-02 NOTE — BH NOTES
Pt visible on the unit meals and meds complaint. Pt attended and participated in schedule group and activities with no problem. Pt offered no new self disclosures this shift, pt denies S/I H/I A/H , no complaints or behavioral problem noted at this time. Pt affect flat and sad, depressed mood, no signs of anxiety noted. Pt remain on Q 15 min checks for safety, will monitor and offer support when needed.

## 2018-04-02 NOTE — BH NOTES
GROUP THERAPY PROGRESS NOTE    The patient Adelaida Lopez a 39 y.o. male is participating in Coping Skills Group. Group time: 45 minutes    Personal goal for participation: To identify positive coping strategies a-z    Goal orientation:  personal    Group therapy participation: active    Therapeutic interventions reviewed and discussed: worksheet    Impression of participation:  The patient was attentive.     Aldair Carreon  4/2/2018  4:53 PM

## 2018-04-02 NOTE — H&P
INITIAL PSYCHIATRIC EVALUATION            IDENTIFICATION:    Patient Name  Renée Foss   Date of Birth 1975   [facility] [id number]   Medical Record Number  [id number]      Age  39 y.o. PCP None   Admit date:  4/1/2018    Room Number  320/02  @ [facility]   Date of Service  4/2/2018            HISTORY         REASON FOR HOSPITALIZATION:  CC:  Pt admitted under a voluntary basis for depression with suicidal ideations poving to be an imminent danger to self and an inability to care for self. HISTORY OF PRESENT ILLNESS:    The patient, Renée Foss, is a 39 y.o. BLACK OR  male with a past psychiatric history significant for depression and substance abuse , who presents at this time with complaints of (and/or evidence of) the following emotional symptoms: depression, suicidal thoughts/threats and anxiety. Additional symptomatology include anxiety, depression worse, feeling depressed and feeling suicidal.  The above symptoms have been present for weeks. These symptoms are of moderate severity. These symptoms are intermittent/ fleeting in nature. The patient's condition has been precipitated by issues in relationship and psychosocial stressors (financial issues  ). Patient's condition made worse by continued illicit drug use and alcohol use as well as treatment noncompliance. UDS: Positive  BAL=240 , he stated he was drunk and he made the suicidal statement while he was drunk but he is now not feeling suicidal at all      ALLERGIES:   Allergies   Allergen Reactions    Shrimp Hives      MEDICATIONS PRIOR TO ADMISSION:   No prescriptions prior to admission. PAST MEDICAL HISTORY:   Past Medical History:   Diagnosis Date    Psychiatric disorder     depression, substance abuse   History reviewed. No pertinent surgical history.    SOCIAL HISTORY:    Social History     Social History    Marital status:      Spouse name: N/A    Number of children: N/A    Years of education: N/A     Occupational History    Not on file. Social History Main Topics    Smoking status: Current Every Day Smoker     Packs/day: 0.50    Smokeless tobacco: Never Used    Alcohol use Yes    Drug use: Yes     Special: Cocaine      Comment: crack cocaine tonight 3/26/18    Sexual activity: Yes     Other Topics Concern    Not on file     Social History Narrative      FAMILY HISTORY: History reviewed. No pertinent family history. Family History   Problem Relation Age of Onset    Hypertension Mother     Heart Disease Father        REVIEW OF SYSTEMS:   History obtained from the patient  Pertinent items are noted in the History of Present Illness. All other Systems reviewed and are considered negative. MENTAL STATUS EXAM & VITALS     MENTAL STATUS EXAM (MSE):    MSE FINDINGS ARE WITHIN NORMAL LIMITS (WNL) UNLESS OTHERWISE STATED BELOW. ( ALL OF THE BELOW CATEGORIES OF THE MSE HAVE BEEN REVIEWED (reviewed 4/2/2018) AND UPDATED AS DEEMED APPROPRIATE )  General Presentation age appropriate, cooperative   Orientation oriented to time, place and person   Vital Signs  See below (reviewed 4/2/2018); Vital Signs (BP, Pulse, & Temp) are within normal limits if not listed below.    Gait and Station Stable/steady, no ataxia   Musculoskeletal System No extrapyramidal symptoms (EPS); no abnormal muscular movements or Tardive Dyskinesia (TD); muscle strength and tone are within normal limits   Language No aphasia or dysarthria   Speech:  normal pitch and normal volume   Thought Processes logical; normal rate of thoughts; fair abstract reasoning/computation   Thought Associations goal directed   Thought Content free of delusions   Suicidal Ideations no plan  and no intention   Homicidal Ideations no plan  and no intention   Mood:  anxious    Affect:  anxious   Memory recent  good   Memory remote:  good   Concentration/Attention:  good   Fund of Knowledge average   Insight:  good   Reliability fair Judgment:  fair          VITALS:     Patient Vitals for the past 24 hrs:   Temp Pulse Resp BP SpO2   04/02/18 0917 98 °F (36.7 °C) 79 16 (!) 136/96 100 %   04/02/18 0246 97.9 °F (36.6 °C) 93 18 133/88 100 %   04/02/18 0220 98.1 °F (36.7 °C) 92 18 (!) 130/92 100 %   04/01/18 1953 97.8 °F (36.6 °C) 92 18 137/87 100 %     Wt Readings from Last 3 Encounters:   04/02/18 66.7 kg (147 lb)   03/27/18 68.9 kg (152 lb)   01/11/15 68.5 kg (151 lb)     Temp Readings from Last 3 Encounters:   04/02/18 98 °F (36.7 °C)   03/28/18 98.2 °F (36.8 °C)   01/11/15 98.6 °F (37 °C)     BP Readings from Last 3 Encounters:   04/02/18 (!) 136/96   03/28/18 (!) 134/92   01/11/15 (!) 157/99     Pulse Readings from Last 3 Encounters:   04/02/18 79   03/28/18 76   01/11/15 99            DATA     LABORATORY DATA:  Labs Reviewed   METABOLIC PANEL, BASIC - Abnormal; Notable for the following:        Result Value    Sodium 146 (*)     Potassium 3.3 (*)     Chloride 109 (*)     All other components within normal limits   DRUG SCREEN, URINE - Abnormal; Notable for the following:     COCAINE POSITIVE (*)     All other components within normal limits   ETHYL ALCOHOL - Abnormal; Notable for the following:     ALCOHOL(ETHYL),SERUM 240 (*)     All other components within normal limits   CBC WITH AUTOMATED DIFF   URINALYSIS W/ REFLEX CULTURE     Admission on 04/01/2018   Component Date Value Ref Range Status    WBC 04/01/2018 7.6  4.1 - 11.1 K/uL Final    RBC 04/01/2018 4.77  4.10 - 5.70 M/uL Final    HGB 04/01/2018 14.7  12.1 - 17.0 g/dL Final    HCT 04/01/2018 42.7  36.6 - 50.3 % Final    MCV 04/01/2018 89.5  80.0 - 99.0 FL Final    MCH 04/01/2018 30.8  26.0 - 34.0 PG Final    MCHC 04/01/2018 34.4  30.0 - 36.5 g/dL Final    RDW 04/01/2018 13.7  11.5 - 14.5 % Final    PLATELET 30/65/0857 096  150 - 400 K/uL Final    MPV 04/01/2018 9.0  8.9 - 12.9 FL Final    NRBC 04/01/2018 0.0  0  WBC Final    ABSOLUTE NRBC 04/01/2018 0.00  0.00 - 0.01 K/uL Final    NEUTROPHILS 04/01/2018 50  32 - 75 % Final    LYMPHOCYTES 04/01/2018 43  12 - 49 % Final    MONOCYTES 04/01/2018 5  5 - 13 % Final    EOSINOPHILS 04/01/2018 1  0 - 7 % Final    BASOPHILS 04/01/2018 1  0 - 1 % Final    IMMATURE GRANULOCYTES 04/01/2018 0  0.0 - 0.5 % Final    ABS. NEUTROPHILS 04/01/2018 3.8  1.8 - 8.0 K/UL Final    ABS. LYMPHOCYTES 04/01/2018 3.3  0.8 - 3.5 K/UL Final    ABS. MONOCYTES 04/01/2018 0.4  0.0 - 1.0 K/UL Final    ABS. EOSINOPHILS 04/01/2018 0.1  0.0 - 0.4 K/UL Final    ABS. BASOPHILS 04/01/2018 0.0  0.0 - 0.1 K/UL Final    ABS. IMM.  GRANS. 04/01/2018 0.0  0.00 - 0.04 K/UL Final    DF 04/01/2018 AUTOMATED    Final    Sodium 04/01/2018 146* 136 - 145 mmol/L Final    Potassium 04/01/2018 3.3* 3.5 - 5.1 mmol/L Final    Chloride 04/01/2018 109* 97 - 108 mmol/L Final    CO2 04/01/2018 26  21 - 32 mmol/L Final    Anion gap 04/01/2018 11  5 - 15 mmol/L Final    Glucose 04/01/2018 97  65 - 100 mg/dL Final    BUN 04/01/2018 13  6 - 20 MG/DL Final    Creatinine 04/01/2018 0.95  0.70 - 1.30 MG/DL Final    BUN/Creatinine ratio 04/01/2018 14  12 - 20   Final    GFR est AA 04/01/2018 >60  >60 ml/min/1.73m2 Final    GFR est non-AA 04/01/2018 >60  >60 ml/min/1.73m2 Final    Calcium 04/01/2018 8.8  8.5 - 10.1 MG/DL Final    AMPHETAMINES 04/01/2018 NEGATIVE   NEG   Final    BARBITURATES 04/01/2018 NEGATIVE   NEG   Final    BENZODIAZEPINES 04/01/2018 NEGATIVE   NEG   Final    COCAINE 04/01/2018 POSITIVE* NEG   Final    METHADONE 04/01/2018 NEGATIVE   NEG   Final    OPIATES 04/01/2018 NEGATIVE   NEG   Final    PCP(PHENCYCLIDINE) 04/01/2018 NEGATIVE   NEG   Final    THC (TH-CANNABINOL) 04/01/2018 NEGATIVE   NEG   Final    Drug screen comment 04/01/2018 (NOTE)   Final    Color 04/01/2018 YELLOW/STRAW    Final    Appearance 04/01/2018 CLEAR  CLEAR   Final    Specific gravity 04/01/2018 <1.005  1.003 - 1.030 Final    pH (UA) 04/01/2018 6.0  5.0 - 8.0   Final  Protein 04/01/2018 NEGATIVE   NEG mg/dL Final    Glucose 04/01/2018 NEGATIVE   NEG mg/dL Final    Ketone 04/01/2018 NEGATIVE   NEG mg/dL Final    Bilirubin 04/01/2018 NEGATIVE   NEG   Final    Blood 04/01/2018 NEGATIVE   NEG   Final    Urobilinogen 04/01/2018 0.2  0.2 - 1.0 EU/dL Final    Nitrites 04/01/2018 NEGATIVE   NEG   Final    Leukocyte Esterase 04/01/2018 NEGATIVE   NEG   Final    WBC 04/01/2018 0-4  0 - 4 /hpf Final    RBC 04/01/2018 0-5  0 - 5 /hpf Final    Epithelial cells 04/01/2018 FEW  FEW /lpf Final    Bacteria 04/01/2018 NEGATIVE   NEG /hpf Final    UA:UC IF INDICATED 04/01/2018 CULTURE NOT INDICATED BY UA RESULT  CNI   Final    ALCOHOL(ETHYL),SERUM 04/01/2018 240* <10 MG/DL Final        RADIOLOGY REPORTS:    Results from Hospital Encounter encounter on 09/08/13   XR CHEST PA LAT   Narrative **Final Report**      ICD Codes / Adm. Diagnosis: 234  786.2 / Chest Congestion    Examination:  CR CHEST PA AND LATERAL  - [id number] - Sep  8 2013  2:16PM  Accession No:  [id number]  Reason:  productive cough / fever / chills      REPORT:  INDICATION:  productive cough / fever / chills    Exam: Chest 2 views. Comparison April 20, 2013. Findings: Cardiomediastinal silhouette is normal. Pulmonary vasculature is   not engorged. No focal parenchymal opacities, effusions, or pneumothorax. Bony thorax is intact. IMPRESSION:  1. No acute cardiopulmonary disease           Signing/Reading Doctor: Clover Polo ([id number])    Approved: Adriana WHITEHEAD ([id number])  Sep  8 2013  2:30PM                               No results found.            MEDICATIONS       ALL MEDICATIONS  Current Facility-Administered Medications   Medication Dose Route Frequency    ondansetron (ZOFRAN ODT) tablet 8 mg  8 mg Oral NOW    loperamide (IMODIUM) capsule 2 mg  2 mg Oral NOW    ziprasidone (GEODON) 20 mg in sterile water (preservative free) 1 mL injection  20 mg IntraMUSCular BID PRN    OLANZapine (ZyPREXA) tablet 5 mg  5 mg Oral Q6H PRN    benztropine (COGENTIN) tablet 2 mg  2 mg Oral BID PRN    benztropine (COGENTIN) injection 2 mg  2 mg IntraMUSCular BID PRN    LORazepam (ATIVAN) injection 2 mg  2 mg IntraMUSCular Q4H PRN    LORazepam (ATIVAN) tablet 1 mg  1 mg Oral Q4H PRN    zolpidem (AMBIEN) tablet 10 mg  10 mg Oral QHS PRN    acetaminophen (TYLENOL) tablet 650 mg  650 mg Oral Q4H PRN    ibuprofen (MOTRIN) tablet 400 mg  400 mg Oral Q8H PRN    magnesium hydroxide (MILK OF MAGNESIA) 400 mg/5 mL oral suspension 30 mL  30 mL Oral DAILY PRN    nicotine (NICODERM CQ) 21 mg/24 hr patch 1 Patch  1 Patch TransDERmal DAILY PRN    folic acid (FOLVITE) tablet 1 mg  1 mg Oral DAILY    multivitamin, tx-iron-ca-min (THERA-M w/ IRON) tablet 1 Tab  1 Tab Oral DAILY    Thiamine Mononitrate (B-1) tablet 100 mg  100 mg Oral DAILY      SCHEDULED MEDICATIONS  Current Facility-Administered Medications   Medication Dose Route Frequency    ondansetron (ZOFRAN ODT) tablet 8 mg  8 mg Oral NOW    loperamide (IMODIUM) capsule 2 mg  2 mg Oral NOW    folic acid (FOLVITE) tablet 1 mg  1 mg Oral DAILY    multivitamin, tx-iron-ca-min (THERA-M w/ IRON) tablet 1 Tab  1 Tab Oral DAILY    Thiamine Mononitrate (B-1) tablet 100 mg  100 mg Oral DAILY                ASSESSMENT & PLAN        The patient, Sagar Castle, is a 39 y.o.  male who presents at this time for treatment of the following diagnoses:  Patient Active Hospital Problem List:   Adjustment disorder with anxious mood   Alcohol abuse     Assessment: anxiety     Plan: counseling             I will continue to monitor blood levels (Depakote, Tegretol, lithium, clozapine---a drug with a narrow therapeutic index= NTI) and associated labs for drug therapy implemented that require intense monitoring for toxicity as deemed appropriate based on current medication side effects and pharmacodynamically determined drug 1/2 lives.          A coordinated, multidisplinary treatment team (includes the nurse, unit pharmcist,  and writer) round was conducted for this initial evaluation with the patient present. The following regarding medications was addressed during rounds with patient:   the risks and benefits of the proposed medication. The patient was given the opportunity to ask questions. Informed consent given to the use of the above medications. I will continue to adjust psychiatric and non-psychiatric medications (see above \"medication\" section and orders section for details) as deemed appropriate & based upon diagnoses and response to treatment. I have reviewed admission (and previous/old) labs and medical tests in the EHR and or transferring hospital documents. I will continue to order blood tests/labs and diagnostic tests as deemed appropriate and review results as they become available (see orders for details). I have reviewed old psychiatric and medical records available in the EHR. I Will order additional psychiatric records from other institutions to further elucidate the nature of patient's psychopathology and review once available. I will gather additional collateral information from friends, family and o/p treatment team to further elucidate the nature of patient's psychopathology and baselline level of psychiatric functioning.       ESTIMATED LENGTH OF STAY:    2       STRENGTHS:  Exercising self-direction/Resourceful, Access to housing/residential stability and Interpersonal/supportive relationships (family, friends, peers)                                        SIGNED:    Artem Adkins MD  4/2/2018

## 2018-04-02 NOTE — BSMART NOTE
Comprehensive Assessment Form Part 1      Section I - Disposition    Axis I - S   Substance induced mood disorder ([facility] [address] ICD-10-CM: F19.94  ICD-9-CM: 292.84             Depressive disorder, not elsewhere classified ICD-10-CM: F32.9  ICD-9-CM: 189             Polysubstance abuse ICD-10-CM: F19.10  ICD-9-CM: 305.90              Axis II - No diagnosis   Axis III - None   Axis IV - strained interpersonal relationships  Axis V - 40      The Medical Doctor to Psychiatrist conference was not completed. The Medical Doctor is in agreement with Psychiatrist disposition because of (reason) patient is depressed; SI with plan requesting admission. The plan is admission to [facility]   The on-call Psychiatrist consulted was Dr. Rubens Wadsworth   The admitting Psychiatrist will be Dr. Rubens Wadsworth  The admitting Diagnosis is Substance induced Mood disorder   The Payor source is self pay   Section II - Integrated Summary  Summary:  Patient is 39year old AA male reporting to ED Patient reported persistent thoughts of SI and depression. He was released from [facility] on 3/28/2018 with plan to follow up with [facility] for treatment. Patient indicates that he has an appointment for 4/18/2018. Patient discussed a [place]. He denied previous attempts but states that he has been feeling suicidal since his girlfriend left him without notice on 3/15/2018. He thought that he could reconcile with her by now but girlfriend has moved all of her personal belongings out of the shared apartment and patient indicates that he has been staying with his mother. Patient thought that he and the girlfriend would reconcile this weekend and brought her a brand new phone for her. Patient reports that he has drank 3-4 beers today and used a small amount of crack today. He has been using crack on an off for 20 years but reports that he only recently relapsed on alcohol since the breakup.  Patient reports that his sleep and eating habits continue to bowen. He presented as tearful, slightly anxious, depressed, flat affect. Dr. Yin Ferreira was consulted and is recommended admission to general [facility] for treatment. The patienthas demonstrated mental capacity to provide informed consent. The information is given by the patient. The Chief Complaint is SI with plan   The Precipitant Factors are recent break-up. Drug and alcohol use. Previous Hospitalizations: Yes 3/26-3/28  The patient has not previously been in restraints. Current Psychiatrist and/or  is Patient recently linked to [facility]. Lethality Assessment:    The potential for suicide noted by the following: defined plan . The potential for homicide is not noted. The patient has not been a perpetrator of sexual or physical abuse. There are not pending charges. The patient is not felt to be at risk for self harm or harm to others. The attending nurse was advised no immediate precautions necessary. Section III - Psychosocial  The patient's overall mood and attitude is depresed. Feelings of helplessness and hopelessness are observed by affect. Generalized anxiety is observed by affect. Panic is not observed. Phobias are not observed. Obsessive compulsive tendencies are not observed. Section IV - Mental Status Exam  The patient's appearance is tense. The patient's behavior is restless. The patient is oriented to time, place, person and situation and disoriented. The patient's speech is soft. The patient's mood is depressed. The range of affect is flat. The patient's thought content demonstrates no evidence of impairment. The thought process shows no evidence of impairment. The patient's perception shows no evidence of impairment. The patient's memory shows no evidence of impairment. The patient's appetite is decreased. The patient's sleep has evidence of insomnia. The patient's insight shows no evidence of impairment. The patient's judgement is psychologically impaired. The patient is using substances. The patient is using tobacco by inhalation for greater than 10 years with last use on today, alcohol for greater than 10 years with last use on today and cocaine by inhalation for greater than 10 years with last use on today. The patient has experienced the following withdrawal symptoms: N/A.        Section VI - Living Arrangements  The patient is single. The patient lives with mother. The patient has one child age 10 years. The patient does plan to return home upon discharge. The patient does not have legal issues pending. The patient's source of income comes from employment. Congregation and cultural practices have not been voiced at this time.     The patient's greatest support comes from no one reported and this person will not be involved with the treatment. The patient has not been in an event described as horrible or outside the realm of ordinary life experience either currently or in the past.  The patient has not been a victim of sexual/physical abuse.     Section VII - Other Areas of Clinical Concern  The highest grade achieved is 7th with the overall quality of school experience being described as unknown. The patient is currently employed and speaks [place] as a primary language. The patient has no communication impairments affecting communication. The patient's preference for learning can be described as: can read and write adequately.   The patient's hearing is normal.  The patient's vision is normal.     Gracie Salazar, Resident in Counseling

## 2018-04-03 VITALS
WEIGHT: 147 LBS | DIASTOLIC BLOOD PRESSURE: 105 MMHG | RESPIRATION RATE: 16 BRPM | BODY MASS INDEX: 19.91 KG/M2 | HEART RATE: 79 BPM | OXYGEN SATURATION: 100 % | TEMPERATURE: 97.5 F | HEIGHT: 72 IN | SYSTOLIC BLOOD PRESSURE: 146 MMHG

## 2018-04-03 PROCEDURE — 74011250637 HC RX REV CODE- 250/637: Performed by: PSYCHIATRY & NEUROLOGY

## 2018-04-03 RX ADMIN — FOLIC ACID 1 MG: 1 TABLET ORAL at 08:04

## 2018-04-03 RX ADMIN — MULTIPLE VITAMINS W/ MINERALS TAB 1 TABLET: TAB at 08:04

## 2018-04-03 RX ADMIN — Medication 100 MG: at 08:05

## 2018-04-03 NOTE — BH NOTES
PSYCHIATRIC PROGRESS NOTE         Patient Name  Tejinder Galvez   Date of Birth 1975   [facility] [id number]   Medical Record Number  [id number]      Age  39 y.o. PCP None   Admit date:  4/1/2018    Room Number  320/02  @ [facility]   Date of Service  4/3/2018          PSYCHOTHERAPY SESSION NOTE:  Length of psychotherapy session: 15 minutes    Main condition/diagnosis/issues treated during session today, 4/3/2018 : alcohol abuse     I employed Cognitive Behavioral therapy techniques, Reality-Oriented psychotherapy, as well as supportive psychotherapy in regards to various ongoing psychosocial stressors, including the following: pre-admission and current problems; housing issues; occupational issues; academic issues; legal issues; medical issues; and stress of hospitalization. Interpersonal relationship issues and psychodynamic conflicts explored. Attempts made to alleviate maladaptive patterns. We, also, worked on issues of denial & effects of substance dependency/use     Overall, patient is  progressing    Treatment Plan Update (reviewed an updated 4/3/2018) : I will modify psychotherapy tx plan by implementing more stress management strategies, building upon cognitive behavioral techniques, increasing coping skills, as well as shoring up psychological defenses). An extended energy and skill set was needed to engage pt in psychotherapy due to some of the following: resistiveness, complexity, negativity, confrontational nature, hostile behaviors, and/or severe abnormalities in thought processes/psychosis resulting in the loss of expressive/receptive language communication skills. E & M PROGRESS NOTE:         HISTORY       CC:  Pt admitted under a voluntary basis for depression with suicidal ideations poving to be an imminent danger to self and an inability to care for self. HISTORY OF PRESENT ILLNESS:    The patient, Tejinder Galvez, is a 39 y.o.   BLACK OR  AMERICAN male with a past psychiatric history significant for depression and substance abuse , who presents at this time with complaints of (and/or evidence of) the following emotional symptoms: depression, suicidal thoughts/threats and anxiety. Additional symptomatology include anxiety, depression worse, feeling depressed and feeling suicidal.  The above symptoms have been present for weeks. These symptoms are of moderate severity. These symptoms are intermittent/ fleeting in nature. The patient's condition has been precipitated by issues in relationship and psychosocial stressors (financial issues  ). Patient's condition made worse by continued illicit drug use and alcohol use as well as treatment noncompliance. UDS: Positive  BAL=240 , he stated he was drunk and he made the suicidal statement while he was drunk but he is now not feeling suicidal at all   4/3/18 he is doing better and no SI or HI and no self harm behavior and no paranoid feeling and no anger issues         SIDE EFFECTS: (reviewed/updated 4/3/2018)  None reported or admitted to. No noted toxicity with use of Depakote/Tegretol/lithium/Clozaril/TCAs   ALLERGIES:(reviewed/updated 4/3/2018)  Allergies   Allergen Reactions    Shrimp Hives      MEDICATIONS PRIOR TO ADMISSION:(reviewed/updated 4/3/2018)  No prescriptions prior to admission. PAST MEDICAL HISTORY: Past medical history from the initial psychiatric evaluation has been reviewed (reviewed/updated 4/3/2018) with no additional updates (I asked patient and no additional past medical history provided). Past Medical History:   Diagnosis Date    Psychiatric disorder     depression, substance abuse   History reviewed. No pertinent surgical history. SOCIAL HISTORY: Social history from the initial psychiatric evaluation has been reviewed (reviewed/updated 4/3/2018) with no additional updates (I asked patient and no additional social history provided).  Social History     Social History    Marital status:      Spouse name: N/A    Number of children: N/A    Years of education: N/A     Occupational History    Not on file. Social History Main Topics    Smoking status: Current Every Day Smoker     Packs/day: 0.50    Smokeless tobacco: Never Used    Alcohol use Yes    Drug use: Yes     Special: Cocaine      Comment: crack cocaine tonight 3/26/18    Sexual activity: Yes     Other Topics Concern    Not on file     Social History Narrative      FAMILY HISTORY: Family history from the initial psychiatric evaluation has been reviewed (reviewed/updated 4/3/2018) with no additional updates (I asked patient and no additional family history provided). Family History   Problem Relation Age of Onset    Hypertension Mother     Heart Disease Father        REVIEW OF SYSTEMS: (reviewed/updated 4/3/2018)  Appetite:improved   Sleep: improved   All other Review of Systems: History obtained from the patient         [facility] (MSE):    MSE FINDINGS ARE WITHIN NORMAL LIMITS (WNL) UNLESS OTHERWISE STATED BELOW. ( ALL OF THE BELOW CATEGORIES OF THE MSE HAVE BEEN REVIEWED (reviewed 4/3/2018) AND UPDATED AS DEEMED APPROPRIATE )  General Presentation age appropriate, cooperative   Orientation oriented to time, place and person   Vital Signs  See below (reviewed 4/3/2018); Vital Signs (BP, Pulse, & Temp) are within normal limits if not listed below.    Gait and Station Stable/steady, no ataxia   Musculoskeletal System No extrapyramidal symptoms (EPS); no abnormal muscular movements or Tardive Dyskinesia (TD); muscle strength and tone are within normal limits   Language No aphasia or dysarthria   Speech:  normal pitch and normal volume   Thought Processes logical; normal rate of thoughts; fair abstract reasoning/computation   Thought Associations goal directed   Thought Content free of delusions   Suicidal Ideations no plan  and no intention   Homicidal Ideations no plan and no intention   Mood:  stable    Affect:  normal   Memory recent  good   Memory remote:  good   Concentration/Attention:  fair   Fund of Knowledge average   Insight:  good   Reliability fair   Judgment:  good          VITALS:     Patient Vitals for the past 24 hrs:   Temp Pulse Resp BP SpO2   04/03/18 0801 97.5 °F (36.4 °C) - 16 (!) 146/105 100 %   04/03/18 0507 97.1 °F (36.2 °C) 79 18 145/85 -     Wt Readings from Last 3 Encounters:   04/02/18 66.7 kg (147 lb)   03/27/18 68.9 kg (152 lb)   01/11/15 68.5 kg (151 lb)     Temp Readings from Last 3 Encounters:   04/03/18 97.5 °F (36.4 °C)   03/28/18 98.2 °F (36.8 °C)   01/11/15 98.6 °F (37 °C)     BP Readings from Last 3 Encounters:   04/03/18 (!) 146/105   03/28/18 (!) 134/92   01/11/15 (!) 157/99     Pulse Readings from Last 3 Encounters:   04/03/18 79   03/28/18 76   01/11/15 99            DATA     LABORATORY DATA:(reviewed/updated 4/3/2018)  No results found for this or any previous visit (from the past 24 hour(s)). No results found for: VALF2, VALAC, VALP, VALPR, DS6, CRBAM, CRBAMP, CARB2, XCRBAM  No results found for: LITHM   RADIOLOGY REPORTS:(reviewed/updated 4/3/2018)  No results found.        MEDICATIONS     ALL MEDICATIONS:   Current Facility-Administered Medications   Medication Dose Route Frequency    ziprasidone (GEODON) 20 mg in sterile water (preservative free) 1 mL injection  20 mg IntraMUSCular BID PRN    OLANZapine (ZyPREXA) tablet 5 mg  5 mg Oral Q6H PRN    benztropine (COGENTIN) tablet 2 mg  2 mg Oral BID PRN    benztropine (COGENTIN) injection 2 mg  2 mg IntraMUSCular BID PRN    LORazepam (ATIVAN) injection 2 mg  2 mg IntraMUSCular Q4H PRN    LORazepam (ATIVAN) tablet 1 mg  1 mg Oral Q4H PRN    zolpidem (AMBIEN) tablet 10 mg  10 mg Oral QHS PRN    acetaminophen (TYLENOL) tablet 650 mg  650 mg Oral Q4H PRN    ibuprofen (MOTRIN) tablet 400 mg  400 mg Oral Q8H PRN    magnesium hydroxide (MILK OF MAGNESIA) 400 mg/5 mL oral suspension 30 mL  30 mL Oral DAILY PRN    nicotine (NICODERM CQ) 21 mg/24 hr patch 1 Patch  1 Patch TransDERmal DAILY PRN    folic acid (FOLVITE) tablet 1 mg  1 mg Oral DAILY    multivitamin, tx-iron-ca-min (THERA-M w/ IRON) tablet 1 Tab  1 Tab Oral DAILY    Thiamine Mononitrate (B-1) tablet 100 mg  100 mg Oral DAILY      SCHEDULED MEDICATIONS:   Current Facility-Administered Medications   Medication Dose Route Frequency    folic acid (FOLVITE) tablet 1 mg  1 mg Oral DAILY    multivitamin, tx-iron-ca-min (THERA-M w/ IRON) tablet 1 Tab  1 Tab Oral DAILY    Thiamine Mononitrate (B-1) tablet 100 mg  100 mg Oral DAILY          ASSESSMENT & PLAN     DIAGNOSES REQUIRING ACTIVE TREATMENT AND MONITORING: (reviewed/updated 4/3/2018)  Patient Active Hospital Problem List:   Alcohol abuse   Need to follow up with [facility] and will be discharge today           I will continue to monitor blood levels (Depakote, Tegretol, lithium, clozapine---a drug with a narrow therapeutic index= NTI) and associated labs for drug therapy implemented that require intense monitoring for toxicity as deemed appropriate based on current medication side effects and pharmacodynamically determined drug 1/2 lives. In summary, Kirit Nuñez, is a 39 y.o.  male who presents with a severe exacerbation of the principal diagnosis of <principal problem not specified>  Patient's condition is worsening/not improving/not stable improving. Patient requires continued inpatient hospitalization for further stabilization, safety monitoring and medication management. I will continue to coordinate the provision of individual, milieu, occupational, group, and substance abuse therapies to address target symptoms/diagnoses as deemed appropriate for the individual patient. A coordinated, multidisplinary treatment team round was conducted with the patient (this team consists of the nurse, psychiatric unit pharmcist,  and writer).      Complete current electronic health record for patient has been reviewed today including consultant notes, ancillary staff notes, nurses and psychiatric tech notes. Suicide risk assessment completed and patient deemed to be of low risk for suicide at this time. The following regarding medications was addressed during rounds with patient:   the risks and benefits of the proposed medication. The patient was given the opportunity to ask questions. Informed consent given to the use of the above medications. Will continue to adjust psychiatric and non-psychiatric medications (see above \"medication\" section and orders section for details) as deemed appropriate & based upon diagnoses and response to treatment. I will continue to order blood tests/labs and diagnostic tests as deemed appropriate and review results as they become available (see orders for details and above listed lab/test results). I will order psychiatric records from previous [facility] hospitals to further elucidate the nature of patient's psychopathology and review once available. I will gather additional collateral information from friends, family and o/p treatment team to further elucidate the nature of patient's psychopathology and baselline level of psychiatric functioning. I certify that this patient's inpatient psychiatric hospital services furnished since the previous certification were, and continue to be, required for treatment that could reasonably be expected to improve the patient's condition, or for diagnostic study, and that the patient continues to need, on a daily basis, active treatment furnished directly by or requiring the supervision of inpatient psychiatric facility personnel. In addition, the hospital records show that services furnished were intensive treatment services, admission or related services, or equivalent services.     EXPECTED DISCHARGE DATE/DAY: TBD     DISPOSITION: Home       Signed By:   Abhijit Cavazos MD  4/3/2018

## 2018-04-03 NOTE — BH NOTES
Behavioral Health Transition Record to Provider    Patient Name: Paris Parker  YOB: 1975  Medical Record Number: [id number]  Date of Admission: 4/1/2018  Date of Discharge: 4/3/2018    Attending Provider: Mitali Soto MD  Discharging Provider: Mitali Soto MD  To contact this individual call [phone] and ask the  to page. If unavailable, ask to be transferred to [address] [place] [address] Provider on call. Devon Arabella Provider will be available on call 24/7 and during holidays. Primary Care Provider: None    Allergies   Allergen Reactions    Shrimp Hives       Reason for Admission: He was released from [facility] on 3/28/2018 with plan to follow up with [facility] for treatment. Patient indicates that he has an appointment for 4/18/2018 and would like to meet with  at a sooner date. Pt reported to the treatment team that he started back drinking last week, reporting that he drank 3-4 beers yesterday and used crack cocaine. He has been using crack on an off for 20 years but reports that he only recently relapsed on alcohol since the breakup. Admission Diagnosis: MDD (major depressive disorder)    * No surgery found *    Results for orders placed or performed during the hospital encounter of 04/01/18   CBC WITH AUTOMATED DIFF   Result Value Ref Range    WBC 7.6 4.1 - 11.1 K/uL    RBC 4.77 4.10 - 5.70 M/uL    HGB 14.7 12.1 - 17.0 g/dL    HCT 42.7 36.6 - 50.3 %    MCV 89.5 80.0 - 99.0 FL    MCH 30.8 26.0 - 34.0 PG    MCHC 34.4 30.0 - 36.5 g/dL    RDW 13.7 11.5 - 14.5 %    PLATELET 012 517 - 699 K/uL    MPV 9.0 8.9 - 12.9 FL    NRBC 0.0 0  WBC    ABSOLUTE NRBC 0.00 0.00 - 0.01 K/uL    NEUTROPHILS 50 32 - 75 %    LYMPHOCYTES 43 12 - 49 %    MONOCYTES 5 5 - 13 %    EOSINOPHILS 1 0 - 7 %    BASOPHILS 1 0 - 1 %    IMMATURE GRANULOCYTES 0 0.0 - 0.5 %    ABS. NEUTROPHILS 3.8 1.8 - 8.0 K/UL    ABS. LYMPHOCYTES 3.3 0.8 - 3.5 K/UL    ABS. MONOCYTES 0.4 0.0 - 1.0 K/UL    ABS.  EOSINOPHILS 0.1 0.0 - 0.4 K/UL    ABS. BASOPHILS 0.0 0.0 - 0.1 K/UL    ABS. IMM.  GRANS. 0.0 0.00 - 0.04 K/UL    DF AUTOMATED     METABOLIC PANEL, BASIC   Result Value Ref Range    Sodium 146 (H) 136 - 145 mmol/L    Potassium 3.3 (L) 3.5 - 5.1 mmol/L    Chloride 109 (H) 97 - 108 mmol/L    CO2 26 21 - 32 mmol/L    Anion gap 11 5 - 15 mmol/L    Glucose 97 65 - 100 mg/dL    BUN 13 6 - 20 MG/DL    Creatinine 0.95 0.70 - 1.30 MG/DL    BUN/Creatinine ratio 14 12 - 20      GFR est AA >60 >60 ml/min/1.73m2    GFR est non-AA >60 >60 ml/min/1.73m2    Calcium 8.8 8.5 - 10.1 MG/DL   DRUG SCREEN, URINE   Result Value Ref Range    AMPHETAMINES NEGATIVE  NEG      BARBITURATES NEGATIVE  NEG      BENZODIAZEPINES NEGATIVE  NEG      COCAINE POSITIVE (A) NEG      METHADONE NEGATIVE  NEG      OPIATES NEGATIVE  NEG      PCP(PHENCYCLIDINE) NEGATIVE  NEG      THC (TH-CANNABINOL) NEGATIVE  NEG      Drug screen comment (NOTE)    URINALYSIS W/ REFLEX CULTURE   Result Value Ref Range    Color YELLOW/STRAW      Appearance CLEAR CLEAR      Specific gravity <1.005 1.003 - 1.030    pH (UA) 6.0 5.0 - 8.0      Protein NEGATIVE  NEG mg/dL    Glucose NEGATIVE  NEG mg/dL    Ketone NEGATIVE  NEG mg/dL    Bilirubin NEGATIVE  NEG      Blood NEGATIVE  NEG      Urobilinogen 0.2 0.2 - 1.0 EU/dL    Nitrites NEGATIVE  NEG      Leukocyte Esterase NEGATIVE  NEG      WBC 0-4 0 - 4 /hpf    RBC 0-5 0 - 5 /hpf    Epithelial cells FEW FEW /lpf    Bacteria NEGATIVE  NEG /hpf    UA:UC IF INDICATED CULTURE NOT INDICATED BY UA RESULT CNI     ETHYL ALCOHOL   Result Value Ref Range    ALCOHOL(ETHYL),SERUM 240 (H) <10 MG/DL       Immunizations administered during this encounter:   Immunization History   Administered Date(s) Administered    TD Vaccine 07/03/2011       Screening for Metabolic Disorders for Patients on Antipsychotic Medications  (Data obtained from the EMR)    Estimated Body Mass Index  Estimated body mass index is 19.94 kg/(m^2) as calculated from the following: Height as of this encounter: 6' (1.829 m). Weight as of this encounter: 66.7 kg (147 lb). Vital Signs/Blood Pressure  Visit Vitals    BP (!) 146/105    Pulse 79    Temp 97.5 °F (36.4 °C)    Resp 16    Ht 6' (1.829 m)    Wt 66.7 kg (147 lb)    SpO2 100%    BMI 19.94 kg/m2       Blood Glucose/Hemoglobin A1c  Lab Results   Component Value Date/Time    Glucose 97 04/01/2018 08:29 PM    Glucose (POC) 98 10/16/2013 08:49 PM       No results found for: HBA1C, HGBE8, GGJ2CVOE     Lipid Panel  No results found for: CHOL, CHOLX, CHLST, CHOLV, 890129, HDL, LDL, LDLC, DLDLP, TGLX, TRIGL, TRIGP, CHHD, CHHDX     Discharge Diagnosis: Please refer to physician's discharge summary. Discharge Plan: Pt will be discharging home today and will be provided a bus ticket to appointment in community. Pt was given information in regard to his substance abuse  at [facility] and explained process to follow up with [facility] for mental and medical health needs. The pt is in agreement with the plan. Discharge Medication List and Instructions:   Current Discharge Medication List      START taking these medications    Details   ondansetron (ZOFRAN ODT) 4 mg disintegrating tablet Take 1 Tab by mouth every eight (8) hours as needed for Nausea. Qty: 10 Tab, Refills: 0      loperamide (IMODIUM A-D) 2 mg tablet Take 1 Tab by mouth four (4) times daily as needed for Diarrhea for up to 10 days. Qty: 12 Tab, Refills: 0             Unresulted Labs     None        To obtain results of studies pending at discharge, please contact N/A    Follow-up Information     Follow up With Details Comments Contact Info    DIMITRY Go to Substance abuse case management  [address] [place] [address]  [phone]  Fax: [phone] in 1 day Intake appointment for mental health services.   Due to the limited slots, you may want to arrive by 7:00am. The doors open at 7:30am and you should sign in at the registration desk. [address]  Hours: Monday-Friday 8:30am- 12:00PM & 1:00PM-4:30pm   [phone] (phone)  [phone] (fax)      None   None ([phone]) Patient stated that they have no PCP              Advanced Directive:   Does the patient have an appointed surrogate decision maker? Unknown   Does the patient have a Medical Advance Directive? Unknown   Does the patient have a Psychiatric Advance Directive? Unknown   If the patient does not have a surrogate or Medical Advance Directive AND Psychiatric Advance Directive, the patient was offered information on these advance directives Unknown      Patient Instructions: Please continue all medications until otherwise directed by physician. Tobacco Cessation Discharge Plan:   Is the patient a smoker and needs referral for smoking cessation? No  Patient referred to the following for smoking cessation with an appointment? No   Patient was offered medication to assist with smoking cessation at discharge? No  Was education for smoking cessation added to the discharge instructions? No    Alcohol/Substance Abuse Discharge Plan:   Does the patient have a history of substance/alcohol abuse and requires a referral for treatment? Yes  Patient referred to the following for substance/alcohol abuse treatment with an appointment? Yes  Patient was offered medication to assist with alcohol cessation at discharge? No  Was education for substance/alcohol abuse added to discharge instructions? Yes    Patient discharged to Home; provided to the patient/caregiver either in hard copy or electronically. Continuing care paperwork was faxed to community mental health providers. Pt is alert and oriented. Pt denies SI/HI/AVH. Pt's mood was euthymic and affect was brighter. Pt's thought process was linear, positive and goal oriented. Pt's insight and judgment are fair. Pt is in agreement with the plan.

## 2018-04-03 NOTE — PROGRESS NOTES
Problem: Suicide/Homicide (Adult/Pediatric)  Goal: *STG: Remains safe in hospital  Pt calm and cooperative. Isolative at times. Minimal interaction with staff and peers. Declined groups. Compliant with meds and meals. No behavioral issues noted so far this shift. Will continue to monitor.

## 2018-04-03 NOTE — BH NOTES
Discharge instructions given and explained the patient verbalized an understanding. Staff verified the instructions. The patient had no home medications to be returned. Valuables and all other belongings returned upon discharge. Staff took patient off the unit.

## 2018-04-03 NOTE — DISCHARGE SUMMARY
PSYCHIATRIC DISCHARGE SUMMARY         IDENTIFICATION:    Patient Name  Brunilda Carroll   Date of Birth 1975   [facility] [id number]   Medical Record Number  [id number]      Age  39 y.o. PCP None   Admit date:  4/1/2018    Discharge date: 4/3/2018   Room Number  320/02  @ [address]   Date of Service  4/3/2018            TYPE OF DISCHARGE: REGULAR               CONDITION AT DISCHARGE: improved       PROVISIONAL & DISCHARGE DIAGNOSES:    Problem List  Date Reviewed: 10/17/2013          Codes Class    MDD (major depressive disorder) ICD-10-CM: F32.9  ICD-9-CM: 296.20         Substance induced mood disorder ([facility] [address] ICD-10-CM: F19.94  ICD-9-CM: 292.84         Depressive disorder, not elsewhere classified ICD-10-CM: F32.9  ICD-9-CM: 344         Polysubstance abuse ICD-10-CM: F19.10  ICD-9-CM: 305.90               Active Hospital Problems    MDD (major depressive disorder)        DISCHARGE DIAGNOSIS:   Axis I:  SEE ABOVE  Axis II: SEE ABOVE  Axis III: SEE ABOVE  Axis IV:  lack of structure  Axis V:  35 on admission, 55 on discharge 65(baseline)       CC & HISTORY OF PRESENT ILLNESS:  CC:  Pt admitted under a voluntary basis for depression with suicidal ideations poving to be an imminent danger to self and an inability to care for self. HISTORY OF PRESENT ILLNESS:    The patient, Brunilda Carroll, is a 39 y. o.  BLACK OR  male with a past psychiatric history significant for depression and substance abuse , who presents at this time with complaints of (and/or evidence of) the following emotional symptoms: depression, suicidal thoughts/threats and anxiety.  Additional symptomatology include anxiety, depression worse, feeling depressed and feeling suicidal.  The above symptoms have been present for weeks. These symptoms are of moderate severity.  These symptoms are intermittent/ fleeting in nature.  The patient's condition has been precipitated by issues in relationship and psychosocial stressors (financial issues  ).  Patient's condition made worse by continued illicit drug use and alcohol use as well as treatment noncompliance. UDS: Positive  BAL=240 , he stated he was drunk and he made the suicidal statement while he was drunk but he is now not feeling suicidal at all   4/3/18 he is doing better and no SI or HI and no self harm behavior and no paranoid feeling and no anger issues        SOCIAL HISTORY:    Social History     Social History    Marital status:      Spouse name: N/A    Number of children: N/A    Years of education: N/A     Occupational History    Not on file. Social History Main Topics    Smoking status: Current Every Day Smoker     Packs/day: 0.50    Smokeless tobacco: Never Used    Alcohol use Yes    Drug use: Yes     Special: Cocaine      Comment: crack cocaine tonight 3/26/18    Sexual activity: Yes     Other Topics Concern    Not on file     Social History Narrative      FAMILY HISTORY:   Family History   Problem Relation Age of Onset    Hypertension Mother     Heart Disease Father              HOSPITALIZATION COURSE:    Gayle Julian was admitted to the inpatient psychiatric unit [facility] for acute psychiatric stabilization in regards to symptomatology as described in the HPI above. The differential diagnosis at time of admission included: depression and alcohol abuse . While on the unit Gayle Julian was involved in individual, group, occupational and milieu therapy. Psychiatric medications were adjusted during this hospitalization including therapy . Gayle Julian demonstrated a slow, but progressive improvement in overall condition. Much of patient's depression appeared to be related to situational stressors, effects of drugs of abuse, and psychological factors. Please see individual progress notes for more specific details regarding patient's hospitalization course.    At time of discharge, Gayle Julian is without significant problems of depression psychosis  flako. Patient free of suicidal and homicidal ideations (appears to be at very low risk of suicide or homicide) and reports many positive predictive factors in terms of not attempting suicide or homicide. Overall presentation at time of discharge is most consistent with the diagnosis of adjustment disorder with depressed mood. Patient with request for discharge today. There are no grounds to seek a TDO. Patient has maximized benefit to be derived from acute inpatient psychiatric treatment. All members of the treatment team concur with each other in regards to plans for discharge today per patient's request.  Patient and family are aware and in agreement with discharge and discharge plan.     Per my last note:          LABS AND IMAGAING:    Labs Reviewed   METABOLIC PANEL, BASIC - Abnormal; Notable for the following:        Result Value    Sodium 146 (*)     Potassium 3.3 (*)     Chloride 109 (*)     All other components within normal limits   DRUG SCREEN, URINE - Abnormal; Notable for the following:     COCAINE POSITIVE (*)     All other components within normal limits   ETHYL ALCOHOL - Abnormal; Notable for the following:     ALCOHOL(ETHYL),SERUM 240 (*)     All other components within normal limits   CBC WITH AUTOMATED DIFF   URINALYSIS W/ REFLEX CULTURE     No results found for: DS35, PHEN, PHENO, PHENT, DILF, DS39, PHENY, PTN, VALF2, VALAC, VALP, VALPR, DS6, CRBAM, CRBAMP, CARB2, XCRBAM  Admission on 04/01/2018, Discharged on 04/03/2018   Component Date Value Ref Range Status    WBC 04/01/2018 7.6  4.1 - 11.1 K/uL Final    RBC 04/01/2018 4.77  4.10 - 5.70 M/uL Final    HGB 04/01/2018 14.7  12.1 - 17.0 g/dL Final    HCT 04/01/2018 42.7  36.6 - 50.3 % Final    MCV 04/01/2018 89.5  80.0 - 99.0 FL Final    MCH 04/01/2018 30.8  26.0 - 34.0 PG Final    MCHC 04/01/2018 34.4  30.0 - 36.5 g/dL Final    RDW 04/01/2018 13.7  11.5 - 14.5 % Final    PLATELET 57/06/2877 310  150 - 400 K/uL Final    MPV 04/01/2018 9.0  8.9 - 12.9 FL Final    NRBC 04/01/2018 0.0  0  WBC Final    ABSOLUTE NRBC 04/01/2018 0.00  0.00 - 0.01 K/uL Final    NEUTROPHILS 04/01/2018 50  32 - 75 % Final    LYMPHOCYTES 04/01/2018 43  12 - 49 % Final    MONOCYTES 04/01/2018 5  5 - 13 % Final    EOSINOPHILS 04/01/2018 1  0 - 7 % Final    BASOPHILS 04/01/2018 1  0 - 1 % Final    IMMATURE GRANULOCYTES 04/01/2018 0  0.0 - 0.5 % Final    ABS. NEUTROPHILS 04/01/2018 3.8  1.8 - 8.0 K/UL Final    ABS. LYMPHOCYTES 04/01/2018 3.3  0.8 - 3.5 K/UL Final    ABS. MONOCYTES 04/01/2018 0.4  0.0 - 1.0 K/UL Final    ABS. EOSINOPHILS 04/01/2018 0.1  0.0 - 0.4 K/UL Final    ABS. BASOPHILS 04/01/2018 0.0  0.0 - 0.1 K/UL Final    ABS. IMM.  GRANS. 04/01/2018 0.0  0.00 - 0.04 K/UL Final    DF 04/01/2018 AUTOMATED    Final    Sodium 04/01/2018 146* 136 - 145 mmol/L Final    Potassium 04/01/2018 3.3* 3.5 - 5.1 mmol/L Final    Chloride 04/01/2018 109* 97 - 108 mmol/L Final    CO2 04/01/2018 26  21 - 32 mmol/L Final    Anion gap 04/01/2018 11  5 - 15 mmol/L Final    Glucose 04/01/2018 97  65 - 100 mg/dL Final    BUN 04/01/2018 13  6 - 20 MG/DL Final    Creatinine 04/01/2018 0.95  0.70 - 1.30 MG/DL Final    BUN/Creatinine ratio 04/01/2018 14  12 - 20   Final    GFR est AA 04/01/2018 >60  >60 ml/min/1.73m2 Final    GFR est non-AA 04/01/2018 >60  >60 ml/min/1.73m2 Final    Calcium 04/01/2018 8.8  8.5 - 10.1 MG/DL Final    AMPHETAMINES 04/01/2018 NEGATIVE   NEG   Final    BARBITURATES 04/01/2018 NEGATIVE   NEG   Final    BENZODIAZEPINES 04/01/2018 NEGATIVE   NEG   Final    COCAINE 04/01/2018 POSITIVE* NEG   Final    METHADONE 04/01/2018 NEGATIVE   NEG   Final    OPIATES 04/01/2018 NEGATIVE   NEG   Final    PCP(PHENCYCLIDINE) 04/01/2018 NEGATIVE   NEG   Final    THC (TH-CANNABINOL) 04/01/2018 NEGATIVE   NEG   Final    Drug screen comment 04/01/2018 (NOTE)   Final    Color 04/01/2018 YELLOW/STRAW    Final    Appearance 04/01/2018 CLEAR  CLEAR   Final    Specific gravity 04/01/2018 <1.005  1.003 - 1.030 Final    pH (UA) 04/01/2018 6.0  5.0 - 8.0   Final    Protein 04/01/2018 NEGATIVE   NEG mg/dL Final    Glucose 04/01/2018 NEGATIVE   NEG mg/dL Final    Ketone 04/01/2018 NEGATIVE   NEG mg/dL Final    Bilirubin 04/01/2018 NEGATIVE   NEG   Final    Blood 04/01/2018 NEGATIVE   NEG   Final    Urobilinogen 04/01/2018 0.2  0.2 - 1.0 EU/dL Final    Nitrites 04/01/2018 NEGATIVE   NEG   Final    Leukocyte Esterase 04/01/2018 NEGATIVE   NEG   Final    WBC 04/01/2018 0-4  0 - 4 /hpf Final    RBC 04/01/2018 0-5  0 - 5 /hpf Final    Epithelial cells 04/01/2018 FEW  FEW /lpf Final    Bacteria 04/01/2018 NEGATIVE   NEG /hpf Final    UA:UC IF INDICATED 04/01/2018 CULTURE NOT INDICATED BY UA RESULT  CNI   Final    ALCOHOL(ETHYL),SERUM 04/01/2018 240* <10 MG/DL Final   Admission on 03/26/2018, Discharged on 03/28/2018   Component Date Value Ref Range Status    WBC 03/26/2018 8.4  4.1 - 11.1 K/uL Final    RBC 03/26/2018 4.78  4.10 - 5.70 M/uL Final    HGB 03/26/2018 14.6  12.1 - 17.0 g/dL Final    HCT 03/26/2018 43.1  36.6 - 50.3 % Final    MCV 03/26/2018 90.2  80.0 - 99.0 FL Final    MCH 03/26/2018 30.5  26.0 - 34.0 PG Final    MCHC 03/26/2018 33.9  30.0 - 36.5 g/dL Final    RDW 03/26/2018 13.6  11.5 - 14.5 % Final    PLATELET 85/29/6143 592  150 - 400 K/uL Final    MPV 03/26/2018 9.1  8.9 - 12.9 FL Final    NRBC 03/26/2018 0.0  0  WBC Final    ABSOLUTE NRBC 03/26/2018 0.00  0.00 - 0.01 K/uL Final    NEUTROPHILS 03/26/2018 62  32 - 75 % Final    LYMPHOCYTES 03/26/2018 34  12 - 49 % Final    MONOCYTES 03/26/2018 3* 5 - 13 % Final    EOSINOPHILS 03/26/2018 1  0 - 7 % Final    BASOPHILS 03/26/2018 1  0 - 1 % Final    IMMATURE GRANULOCYTES 03/26/2018 0  0.0 - 0.5 % Final    ABS. NEUTROPHILS 03/26/2018 5.2  1.8 - 8.0 K/UL Final    ABS. LYMPHOCYTES 03/26/2018 2.8  0.8 - 3.5 K/UL Final    ABS.  MONOCYTES 03/26/2018 0.3  0.0 - 1.0 K/UL Final    ABS. EOSINOPHILS 03/26/2018 0.1  0.0 - 0.4 K/UL Final    ABS. BASOPHILS 03/26/2018 0.0  0.0 - 0.1 K/UL Final    ABS. IMM.  GRANS. 03/26/2018 0.0  0.00 - 0.04 K/UL Final    DF 03/26/2018 AUTOMATED    Final    Sodium 03/26/2018 142  136 - 145 mmol/L Final    Potassium 03/26/2018 3.3* 3.5 - 5.1 mmol/L Final    Chloride 03/26/2018 106  97 - 108 mmol/L Final    CO2 03/26/2018 27  21 - 32 mmol/L Final    Anion gap 03/26/2018 9  5 - 15 mmol/L Final    Glucose 03/26/2018 97  65 - 100 mg/dL Final    BUN 03/26/2018 13  6 - 20 MG/DL Final    Creatinine 03/26/2018 1.00  0.70 - 1.30 MG/DL Final    BUN/Creatinine ratio 03/26/2018 13  12 - 20   Final    GFR est AA 03/26/2018 >60  >60 ml/min/1.73m2 Final    GFR est non-AA 03/26/2018 >60  >60 ml/min/1.73m2 Final    Calcium 03/26/2018 8.9  8.5 - 10.1 MG/DL Final    ALCOHOL(ETHYL),SERUM 03/26/2018 205* <10 MG/DL Final    AMPHETAMINES 03/26/2018 NEGATIVE   NEG   Final    BARBITURATES 03/26/2018 NEGATIVE   NEG   Final    BENZODIAZEPINES 03/26/2018 NEGATIVE   NEG   Final    COCAINE 03/26/2018 POSITIVE* NEG   Final    METHADONE 03/26/2018 NEGATIVE   NEG   Final    OPIATES 03/26/2018 NEGATIVE   NEG   Final    PCP(PHENCYCLIDINE) 03/26/2018 NEGATIVE   NEG   Final    THC (TH-CANNABINOL) 03/26/2018 NEGATIVE   NEG   Final    Drug screen comment 03/26/2018 (NOTE)   Final    Color 03/26/2018 YELLOW/STRAW    Final    Appearance 03/26/2018 CLEAR  CLEAR   Final    Specific gravity 03/26/2018 <1.005  1.003 - 1.030 Final    pH (UA) 03/26/2018 6.0  5.0 - 8.0   Final    Protein 03/26/2018 NEGATIVE   NEG mg/dL Final    Glucose 03/26/2018 NEGATIVE   NEG mg/dL Final    Ketone 03/26/2018 NEGATIVE   NEG mg/dL Final    Bilirubin 03/26/2018 NEGATIVE   NEG   Final    Blood 03/26/2018 NEGATIVE   NEG   Final    Urobilinogen 03/26/2018 0.2  0.2 - 1.0 EU/dL Final    Nitrites 03/26/2018 NEGATIVE   NEG   Final    Leukocyte Esterase 03/26/2018 NEGATIVE   NEG   Final    WBC 03/26/2018 0-4  0 - 4 /hpf Final    RBC 03/26/2018 0-5  0 - 5 /hpf Final    Epithelial cells 03/26/2018 FEW  FEW /lpf Final    Bacteria 03/26/2018 NEGATIVE   NEG /hpf Final    UA:UC IF INDICATED 03/26/2018 CULTURE NOT INDICATED BY UA RESULT  CNI   Final     No results found. DISPOSITION:    Home. Patient to f/u with psychiatric, and psychotherapy appointments. Patient is to f/u with internist as directed. FOLLOW-UP CARE:    Activity as tolerated and no driving   Regular Diet  Wound Care: none needed. Follow-up Information     Follow up With Details Comments Contact Info    DIMITRY Go on 4/17/2018 Substance abuse case management appointment on 4/17/18 at 10:30 AM with Thee Clemente. [address] [facility] [place][note 2]  [phone]  Fax: [address] Go in 1 day Intake appointment for mental health services. Due to the limited slots, you may want to arrive by 7:00am. The doors open at 7:30am and you should sign in at the registration desk. [address]  Hours: Monday-Friday 8:30am- 12:00PM & 1:00PM-4:30pm   [phone] (phone)  [phone] (fax)      [address] is a Substance Abuse Rehab Services  Go on 4/5/2018 Please attend Recovery Support Groups on Mondays & Thursdays at 2:00 PM  Address: [address] [place] [address], [address]    Phone Number: [phone]                 PROGNOSIS:   Good--- based on nature of patient's pathology/ies and treatment compliance issues. Prognosis is greatly dependent upon patient's ability to remain sober and to follow up with drug/etoh rehabilitation and psychiatric/psychotherapy appointments as well as to comply with psychiatric medications as prescribed. DISCHARGE MEDICATIONS: (no changes made).     Informed consent given for the use of following psychotropic medications:  Discharge Medication List as of 4/3/2018  2:33 PM      START taking these medications    Details   ondansetron (ZOFRAN ODT) 4 mg disintegrating tablet Take 1 Tab by mouth every eight (8) hours as needed for Nausea. , Print, Disp-10 Tab, R-0      loperamide (IMODIUM A-D) 2 mg tablet Take 1 Tab by mouth four (4) times daily as needed for Diarrhea for up to 10 days. , Print, Disp-12 Tab, R-0                    A coordinated, multidisplinary treatment team round was conducted with John Goodwin is done daily here at [facility]. This team consists of the nurse, psychiatric unit pharmcist,  and writer. I have spent greater than 35 minutes on discharge work.     Signed:  Luis Viramontes MD  4/3/2018

## 2018-04-03 NOTE — DISCHARGE INSTRUCTIONS
Depression and Chronic Disease: Care Instructions  Your Care Instructions    A chronic disease is one that you have for a long time. Some chronic diseases can be controlled, but they usually cannot be cured. Depression is common in people with chronic diseases, but it often goes unnoticed. Many people have concerns about seeking treatment for a mental health problem. You may think it's a sign of weakness, or you don't want people to know about it. It's important to overcome these reasons for not seeking treatment. Treating depression or anxiety is good for your health. Follow-up care is a key part of your treatment and safety. Be sure to make and go to all appointments, and call your doctor if you are having problems. It's also a good idea to know your test results and keep a list of the medicines you take. How can you care for yourself at home? Watch for symptoms of depression  The symptoms of depression are often subtle at first. You may think they are caused by your disease rather than depression. Or you may think it is normal to be depressed when you have a chronic disease. If you are depressed you may:  · Feel sad or hopeless. · Feel guilty or worthless. · Not enjoy the things you used to enjoy. · Feel hopeless, as though life is not worth living. · Have trouble thinking or remembering. · Have low energy, and you may not eat or sleep well. · Pull away from others. · Think often about death or killing yourself. (Keep the numbers for these national suicide hotlines: 0-181-011-TALK [1-182.792.5800] and 4-272-WUEEWSE [1-333.174.8419]. )  Get treatment  By treating your depression, you can feel more hopeful and have more energy. If you feel better, you may take better care of yourself, so your health may improve. · Talk to your doctor if you have any changes in mood during treatment for your disease. · Ask your doctor for help.  Counseling, antidepressant medicine, or a combination of the two can help most people with depression. Often a combination works best. Counseling can also help you cope with having a chronic disease. When should you call for help? Call 911 anytime you think you may need emergency care. For example, call if:  ? · You feel like hurting yourself or someone else. ? · Someone you know has depression and is about to attempt or is attempting suicide. ?Call your doctor now or seek immediate medical care if:  ? · You hear voices. ? · Someone you know has depression and:  ¨ Starts to give away his or her possessions. ¨ Uses illegal drugs or drinks alcohol heavily. ¨ Talks or writes about death, including writing suicide notes or talking about guns, knives, or pills. ¨ Starts to spend a lot of time alone. ¨ Acts very aggressively or suddenly appears calm. ? Watch closely for changes in your health, and be sure to contact your doctor if:  ? · You do not get better as expected. Where can you learn more? Go to http://grace-faustina.info/. Enter L272 in the search box to learn more about \"Depression and Chronic Disease: Care Instructions. \"  Current as of: May 12, 2017  Content Version: 11.4  © 3644-8468 [employer]. Care instructions adapted under license by [employer] (which disclaims liability or warranty for this information). If you have questions about a medical condition or this instruction, always ask your healthcare professional. [place] [address] any warranty or liability for your use of this information. Gastroenteritis: Care Instructions  Your Care Instructions    Gastroenteritis is an illness that may cause nausea, vomiting, and diarrhea. It is sometimes called \"stomach flu. \" It can be caused by bacteria or a virus. You will probably begin to feel better in 1 to 2 days. In the meantime, get plenty of rest and make sure you do not become dehydrated.  Dehydration occurs when your body loses too much fluid. Follow-up care is a key part of your treatment and safety. Be sure to make and go to all appointments, and call your doctor if you are having problems. It's also a good idea to know your test results and keep a list of the medicines you take. How can you care for yourself at home? · If your doctor prescribed antibiotics, take them as directed. Do not stop taking them just because you feel better. You need to take the full course of antibiotics. · Drink plenty of fluids to prevent dehydration, enough so that your urine is light yellow or clear like water. Choose water and other caffeine-free clear liquids until you feel better. If you have kidney, heart, or liver disease and have to limit fluids, talk with your doctor before you increase your fluid intake. · Drink fluids slowly, in frequent, small amounts, because drinking too much too fast can cause vomiting. · Begin eating mild foods, such as dry toast, yogurt, applesauce, bananas, and rice. Avoid spicy, hot, or high-fat foods, and do not drink alcohol or caffeine for a day or two. Do not drink milk or eat ice cream until you are feeling better. How to prevent gastroenteritis  · Keep hot foods hot and cold foods cold. · Do not eat meats, dressings, salads, or other foods that have been kept at room temperature for more than 2 hours. · Use a thermometer to check your refrigerator. It should be between 34°F and 40°F.  · Defrost meats in the refrigerator or microwave, not on the kitchen counter. · Keep your hands and your kitchen clean. Wash your hands, cutting boards, and countertops with hot soapy water frequently. · Cook meat until it is well done. · Do not eat raw eggs or uncooked sauces made with raw eggs. · Do not take chances. If food looks or tastes spoiled, throw it out. When should you call for help? Call 911 anytime you think you may need emergency care. For example, call if:  ? · You vomit blood or what looks like coffee grounds.    ? · You passed out (lost consciousness). ? · You pass maroon or very bloody stools. ?Call your doctor now or seek immediate medical care if:  ? · You have severe belly pain. ? · You have signs of needing more fluids. You have sunken eyes, a dry mouth, and pass only a little dark urine. ? · You feel like you are going to faint. ? · You have increased belly pain that does not go away in 1 to 2 days. ? · You have new or increased nausea, or you are vomiting. ? · You have a new or higher fever. ? · Your stools are black and tarlike or have streaks of blood. ? Watch closely for changes in your health, and be sure to contact your doctor if:  ? · You are dizzy or lightheaded. ? · You urinate less than usual, or your urine is dark yellow or brown. ? · You do not feel better with each day that goes by. Where can you learn more? Go to http://grace-faustina.info/. Enter N142 in the search box to learn more about \"Gastroenteritis: Care Instructions. \"  Current as of: March 3, 2017  Content Version: 11.4  © 6512-6394 [employer]. Care instructions adapted under license by [employer] (which disclaims liability or warranty for this information). If you have questions about a medical condition or this instruction, always ask your healthcare professional. [address] any warranty or liability for your use of this information.